# Patient Record
Sex: FEMALE | Race: OTHER | HISPANIC OR LATINO
[De-identification: names, ages, dates, MRNs, and addresses within clinical notes are randomized per-mention and may not be internally consistent; named-entity substitution may affect disease eponyms.]

---

## 2019-01-01 ENCOUNTER — RESULT REVIEW (OUTPATIENT)
Age: 84
End: 2019-01-01

## 2019-01-01 ENCOUNTER — INPATIENT (INPATIENT)
Facility: HOSPITAL | Age: 84
LOS: 6 days | DRG: 219 | End: 2019-10-24
Attending: SURGERY | Admitting: SURGERY
Payer: MEDICARE

## 2019-01-01 ENCOUNTER — APPOINTMENT (OUTPATIENT)
Dept: VASCULAR SURGERY | Facility: HOSPITAL | Age: 84
End: 2019-01-01

## 2019-01-01 VITALS
RESPIRATION RATE: 18 BRPM | SYSTOLIC BLOOD PRESSURE: 137 MMHG | HEIGHT: 60 IN | OXYGEN SATURATION: 96 % | HEART RATE: 63 BPM | DIASTOLIC BLOOD PRESSURE: 87 MMHG | WEIGHT: 125.66 LBS

## 2019-01-01 VITALS — RESPIRATION RATE: 24 BRPM

## 2019-01-01 DIAGNOSIS — I10 ESSENTIAL (PRIMARY) HYPERTENSION: ICD-10-CM

## 2019-01-01 DIAGNOSIS — Z01.810 ENCOUNTER FOR PREPROCEDURAL CARDIOVASCULAR EXAMINATION: ICD-10-CM

## 2019-01-01 DIAGNOSIS — I71.4 ABDOMINAL AORTIC ANEURYSM, WITHOUT RUPTURE: ICD-10-CM

## 2019-01-01 DIAGNOSIS — R04.0 EPISTAXIS: ICD-10-CM

## 2019-01-01 DIAGNOSIS — E87.6 HYPOKALEMIA: ICD-10-CM

## 2019-01-01 DIAGNOSIS — N39.0 URINARY TRACT INFECTION, SITE NOT SPECIFIED: ICD-10-CM

## 2019-01-01 LAB
-  AMPICILLIN/SULBACTAM: SIGNIFICANT CHANGE UP
-  AMPICILLIN: SIGNIFICANT CHANGE UP
-  CEFAZOLIN: SIGNIFICANT CHANGE UP
-  CEFTRIAXONE: SIGNIFICANT CHANGE UP
-  CIPROFLOXACIN: SIGNIFICANT CHANGE UP
-  GENTAMICIN: SIGNIFICANT CHANGE UP
-  NITROFURANTOIN: SIGNIFICANT CHANGE UP
-  PIPERACILLIN/TAZOBACTAM: SIGNIFICANT CHANGE UP
-  TOBRAMYCIN: SIGNIFICANT CHANGE UP
-  TRIMETHOPRIM/SULFAMETHOXAZOLE: SIGNIFICANT CHANGE UP
ALBUMIN SERPL ELPH-MCNC: 1.9 G/DL — LOW (ref 3.3–5)
ALBUMIN SERPL ELPH-MCNC: 1.9 G/DL — LOW (ref 3.3–5)
ALBUMIN SERPL ELPH-MCNC: 2.2 G/DL — LOW (ref 3.3–5)
ALBUMIN SERPL ELPH-MCNC: 2.3 G/DL — LOW (ref 3.3–5)
ALBUMIN SERPL ELPH-MCNC: 2.5 G/DL — LOW (ref 3.3–5)
ALBUMIN SERPL ELPH-MCNC: 2.7 G/DL — LOW (ref 3.3–5)
ALBUMIN SERPL ELPH-MCNC: 2.7 G/DL — LOW (ref 3.3–5)
ALBUMIN SERPL ELPH-MCNC: 2.8 G/DL — LOW (ref 3.3–5)
ALBUMIN SERPL ELPH-MCNC: 2.8 G/DL — LOW (ref 3.3–5)
ALBUMIN SERPL ELPH-MCNC: 3 G/DL — LOW (ref 3.3–5)
ALBUMIN SERPL ELPH-MCNC: 3.6 G/DL — SIGNIFICANT CHANGE UP (ref 3.3–5)
ALBUMIN SERPL ELPH-MCNC: 3.9 G/DL — SIGNIFICANT CHANGE UP (ref 3.3–5)
ALP SERPL-CCNC: 108 U/L — SIGNIFICANT CHANGE UP (ref 40–120)
ALP SERPL-CCNC: 118 U/L — SIGNIFICANT CHANGE UP (ref 40–120)
ALP SERPL-CCNC: 140 U/L — HIGH (ref 40–120)
ALP SERPL-CCNC: 165 U/L — HIGH (ref 40–120)
ALP SERPL-CCNC: 233 U/L — HIGH (ref 40–120)
ALP SERPL-CCNC: 55 U/L — SIGNIFICANT CHANGE UP (ref 40–120)
ALP SERPL-CCNC: 56 U/L — SIGNIFICANT CHANGE UP (ref 40–120)
ALP SERPL-CCNC: 62 U/L — SIGNIFICANT CHANGE UP (ref 40–120)
ALP SERPL-CCNC: 74 U/L — SIGNIFICANT CHANGE UP (ref 40–120)
ALP SERPL-CCNC: 83 U/L — SIGNIFICANT CHANGE UP (ref 40–120)
ALP SERPL-CCNC: 94 U/L — SIGNIFICANT CHANGE UP (ref 40–120)
ALP SERPL-CCNC: 98 U/L — SIGNIFICANT CHANGE UP (ref 40–120)
ALT FLD-CCNC: 1447 U/L — HIGH (ref 10–45)
ALT FLD-CCNC: 1450 U/L — HIGH (ref 10–45)
ALT FLD-CCNC: 1639 U/L — HIGH (ref 10–45)
ALT FLD-CCNC: 1674 U/L — HIGH (ref 10–45)
ALT FLD-CCNC: 1706 U/L — HIGH (ref 10–45)
ALT FLD-CCNC: 253 U/L — HIGH (ref 10–45)
ALT FLD-CCNC: 49 U/L — HIGH (ref 10–45)
ALT FLD-CCNC: 49 U/L — HIGH (ref 10–45)
ALT FLD-CCNC: 54 U/L — HIGH (ref 10–45)
ALT FLD-CCNC: 6 U/L — LOW (ref 10–45)
ALT FLD-CCNC: 6 U/L — LOW (ref 10–45)
ALT FLD-CCNC: 78 U/L — HIGH (ref 10–45)
AMMONIA BLD-MCNC: 151 UMOL/L — HIGH (ref 11–55)
AMMONIA BLD-MCNC: 160 UMOL/L — HIGH (ref 11–55)
AMMONIA BLD-MCNC: 165 UMOL/L — HIGH (ref 11–55)
AMMONIA BLD-MCNC: 261 UMOL/L — HIGH (ref 11–55)
AMYLASE P1 CFR SERPL: 176 U/L — HIGH (ref 25–125)
AMYLASE P1 CFR SERPL: 191 U/L — HIGH (ref 25–125)
AMYLASE P1 CFR SERPL: 212 U/L — HIGH (ref 25–125)
AMYLASE P1 CFR SERPL: 229 U/L — HIGH (ref 25–125)
ANION GAP SERPL CALC-SCNC: 10 MMOL/L — SIGNIFICANT CHANGE UP (ref 5–17)
ANION GAP SERPL CALC-SCNC: 10 MMOL/L — SIGNIFICANT CHANGE UP (ref 5–17)
ANION GAP SERPL CALC-SCNC: 11 MMOL/L — SIGNIFICANT CHANGE UP (ref 5–17)
ANION GAP SERPL CALC-SCNC: 12 MMOL/L — SIGNIFICANT CHANGE UP (ref 5–17)
ANION GAP SERPL CALC-SCNC: 12 MMOL/L — SIGNIFICANT CHANGE UP (ref 5–17)
ANION GAP SERPL CALC-SCNC: 13 MMOL/L — SIGNIFICANT CHANGE UP (ref 5–17)
ANION GAP SERPL CALC-SCNC: 14 MMOL/L — SIGNIFICANT CHANGE UP (ref 5–17)
ANION GAP SERPL CALC-SCNC: 17 MMOL/L — SIGNIFICANT CHANGE UP (ref 5–17)
ANION GAP SERPL CALC-SCNC: 18 MMOL/L — HIGH (ref 5–17)
ANION GAP SERPL CALC-SCNC: 22 MMOL/L — HIGH (ref 5–17)
ANION GAP SERPL CALC-SCNC: 24 MMOL/L — HIGH (ref 5–17)
ANION GAP SERPL CALC-SCNC: 27 MMOL/L — HIGH (ref 5–17)
ANION GAP SERPL CALC-SCNC: 28 MMOL/L — HIGH (ref 5–17)
APPEARANCE UR: ABNORMAL
APPEARANCE UR: CLEAR — SIGNIFICANT CHANGE UP
APPEARANCE UR: CLEAR — SIGNIFICANT CHANGE UP
APTT BLD: 104.1 SEC — HIGH (ref 27.5–36.3)
APTT BLD: 31.8 SEC — SIGNIFICANT CHANGE UP (ref 27.5–36.3)
APTT BLD: 33.5 SEC — SIGNIFICANT CHANGE UP (ref 27.5–36.3)
APTT BLD: 37.7 SEC — HIGH (ref 27.5–36.3)
APTT BLD: 39.5 SEC — HIGH (ref 27.5–36.3)
APTT BLD: 39.7 SEC — HIGH (ref 27.5–36.3)
APTT BLD: 39.9 SEC — HIGH (ref 27.5–36.3)
APTT BLD: 40.5 SEC — HIGH (ref 27.5–36.3)
APTT BLD: 43.6 SEC — HIGH (ref 27.5–36.3)
APTT BLD: 44.5 SEC — HIGH (ref 27.5–36.3)
APTT BLD: 49.2 SEC — HIGH (ref 27.5–36.3)
APTT BLD: 50.5 SEC — HIGH (ref 27.5–36.3)
APTT BLD: 52.6 SEC — HIGH (ref 27.5–36.3)
APTT BLD: 53.3 SEC — HIGH (ref 27.5–36.3)
APTT BLD: 66 SEC — HIGH (ref 27.5–36.3)
AST SERPL-CCNC: 1026 U/L — HIGH (ref 10–40)
AST SERPL-CCNC: 107 U/L — HIGH (ref 10–40)
AST SERPL-CCNC: 13 U/L — SIGNIFICANT CHANGE UP (ref 10–40)
AST SERPL-CCNC: 157 U/L — HIGH (ref 10–40)
AST SERPL-CCNC: 16 U/L — SIGNIFICANT CHANGE UP (ref 10–40)
AST SERPL-CCNC: 163 U/L — HIGH (ref 10–40)
AST SERPL-CCNC: 271 U/L — HIGH (ref 10–40)
AST SERPL-CCNC: >7000 U/L — HIGH (ref 10–40)
AST SERPL-CCNC: >7000 U/L — SIGNIFICANT CHANGE UP (ref 10–40)
AST SERPL-CCNC: SIGNIFICANT CHANGE UP U/L (ref 10–40)
BACTERIA # UR AUTO: PRESENT /HPF
BASE EXCESS BLDA CALC-SCNC: -11.1 MMOL/L — LOW (ref -2–3)
BASE EXCESS BLDA CALC-SCNC: -12 MMOL/L — LOW (ref -2–3)
BASE EXCESS BLDA CALC-SCNC: -14 MMOL/L — LOW (ref -2–3)
BASE EXCESS BLDA CALC-SCNC: -18.1 MMOL/L — LOW (ref -2–3)
BASE EXCESS BLDA CALC-SCNC: -3.1 MMOL/L — LOW (ref -2–3)
BASE EXCESS BLDA CALC-SCNC: -3.6 MMOL/L — LOW (ref -2–3)
BASE EXCESS BLDA CALC-SCNC: -4 MMOL/L — LOW (ref -2–3)
BASE EXCESS BLDA CALC-SCNC: -4.2 MMOL/L — LOW (ref -2–3)
BASE EXCESS BLDA CALC-SCNC: -4.6 MMOL/L — LOW (ref -2–3)
BASE EXCESS BLDA CALC-SCNC: -5.3 MMOL/L — LOW (ref -2–3)
BASE EXCESS BLDA CALC-SCNC: -5.4 MMOL/L — LOW (ref -2–3)
BASE EXCESS BLDA CALC-SCNC: -5.6 MMOL/L — LOW (ref -2–3)
BASE EXCESS BLDA CALC-SCNC: -5.9 MMOL/L — LOW (ref -2–3)
BASE EXCESS BLDA CALC-SCNC: -6.8 MMOL/L — LOW (ref -2–3)
BASE EXCESS BLDA CALC-SCNC: -7.2 MMOL/L — LOW (ref -2–3)
BASE EXCESS BLDA CALC-SCNC: -7.3 MMOL/L — LOW (ref -2–3)
BASE EXCESS BLDA CALC-SCNC: -8.2 MMOL/L — LOW (ref -2–3)
BASE EXCESS BLDA CALC-SCNC: -8.2 MMOL/L — LOW (ref -2–3)
BASE EXCESS BLDA CALC-SCNC: -8.8 MMOL/L — LOW (ref -2–3)
BASE EXCESS BLDA CALC-SCNC: -8.8 MMOL/L — LOW (ref -2–3)
BASOPHILS # BLD AUTO: 0.03 K/UL — SIGNIFICANT CHANGE UP (ref 0–0.2)
BASOPHILS NFR BLD AUTO: 0.7 % — SIGNIFICANT CHANGE UP (ref 0–2)
BILIRUB DIRECT SERPL-MCNC: <0.2 MG/DL — SIGNIFICANT CHANGE UP (ref 0–0.2)
BILIRUB DIRECT SERPL-MCNC: <0.2 MG/DL — SIGNIFICANT CHANGE UP (ref 0–0.2)
BILIRUB INDIRECT FLD-MCNC: >0.3 MG/DL — SIGNIFICANT CHANGE UP (ref 0.2–1)
BILIRUB INDIRECT FLD-MCNC: >0.3 MG/DL — SIGNIFICANT CHANGE UP (ref 0.2–1.2)
BILIRUB SERPL-MCNC: 0.2 MG/DL — SIGNIFICANT CHANGE UP (ref 0.2–1.2)
BILIRUB SERPL-MCNC: 0.3 MG/DL — SIGNIFICANT CHANGE UP (ref 0.2–1.2)
BILIRUB SERPL-MCNC: 0.4 MG/DL — SIGNIFICANT CHANGE UP (ref 0.2–1.2)
BILIRUB SERPL-MCNC: 0.5 MG/DL — SIGNIFICANT CHANGE UP (ref 0.2–1.2)
BILIRUB SERPL-MCNC: 1.2 MG/DL — SIGNIFICANT CHANGE UP (ref 0.2–1.2)
BILIRUB SERPL-MCNC: 1.6 MG/DL — HIGH (ref 0.2–1.2)
BILIRUB SERPL-MCNC: 1.8 MG/DL — HIGH (ref 0.2–1.2)
BILIRUB SERPL-MCNC: 1.9 MG/DL — HIGH (ref 0.2–1.2)
BILIRUB SERPL-MCNC: 1.9 MG/DL — HIGH (ref 0.2–1.2)
BILIRUB UR-MCNC: NEGATIVE — SIGNIFICANT CHANGE UP
BLD GP AB SCN SERPL QL: NEGATIVE — SIGNIFICANT CHANGE UP
BUN SERPL-MCNC: 10 MG/DL — SIGNIFICANT CHANGE UP (ref 7–23)
BUN SERPL-MCNC: 11 MG/DL — SIGNIFICANT CHANGE UP (ref 7–23)
BUN SERPL-MCNC: 14 MG/DL — SIGNIFICANT CHANGE UP (ref 7–23)
BUN SERPL-MCNC: 14 MG/DL — SIGNIFICANT CHANGE UP (ref 7–23)
BUN SERPL-MCNC: 16 MG/DL — SIGNIFICANT CHANGE UP (ref 7–23)
BUN SERPL-MCNC: 16 MG/DL — SIGNIFICANT CHANGE UP (ref 7–23)
BUN SERPL-MCNC: 18 MG/DL — SIGNIFICANT CHANGE UP (ref 7–23)
BUN SERPL-MCNC: 19 MG/DL — SIGNIFICANT CHANGE UP (ref 7–23)
BUN SERPL-MCNC: 21 MG/DL — SIGNIFICANT CHANGE UP (ref 7–23)
BUN SERPL-MCNC: 23 MG/DL — SIGNIFICANT CHANGE UP (ref 7–23)
BUN SERPL-MCNC: 23 MG/DL — SIGNIFICANT CHANGE UP (ref 7–23)
BUN SERPL-MCNC: 24 MG/DL — HIGH (ref 7–23)
BUN SERPL-MCNC: 25 MG/DL — HIGH (ref 7–23)
BUN SERPL-MCNC: 25 MG/DL — HIGH (ref 7–23)
BUN SERPL-MCNC: 28 MG/DL — HIGH (ref 7–23)
CA-I BLDA-SCNC: 0.86 MMOL/L — LOW (ref 1.12–1.3)
CA-I BLDA-SCNC: 0.87 MMOL/L — LOW (ref 1.12–1.3)
CA-I BLDA-SCNC: 0.9 MMOL/L — LOW (ref 1.12–1.3)
CA-I BLDA-SCNC: 0.94 MMOL/L — LOW (ref 1.12–1.3)
CA-I BLDA-SCNC: 0.97 MMOL/L — LOW (ref 1.12–1.3)
CA-I BLDA-SCNC: 1.06 MMOL/L — LOW (ref 1.12–1.3)
CA-I BLDA-SCNC: 1.07 MMOL/L — LOW (ref 1.12–1.3)
CA-I BLDA-SCNC: 1.08 MMOL/L — LOW (ref 1.12–1.3)
CA-I BLDA-SCNC: 1.1 MMOL/L — LOW (ref 1.12–1.3)
CALCIUM SERPL-MCNC: 10.6 MG/DL — HIGH (ref 8.4–10.5)
CALCIUM SERPL-MCNC: 7.6 MG/DL — LOW (ref 8.4–10.5)
CALCIUM SERPL-MCNC: 7.7 MG/DL — LOW (ref 8.4–10.5)
CALCIUM SERPL-MCNC: 7.8 MG/DL — LOW (ref 8.4–10.5)
CALCIUM SERPL-MCNC: 7.9 MG/DL — LOW (ref 8.4–10.5)
CALCIUM SERPL-MCNC: 8 MG/DL — LOW (ref 8.4–10.5)
CALCIUM SERPL-MCNC: 8 MG/DL — LOW (ref 8.4–10.5)
CALCIUM SERPL-MCNC: 8.2 MG/DL — LOW (ref 8.4–10.5)
CALCIUM SERPL-MCNC: 8.2 MG/DL — LOW (ref 8.4–10.5)
CALCIUM SERPL-MCNC: 8.4 MG/DL — SIGNIFICANT CHANGE UP (ref 8.4–10.5)
CALCIUM SERPL-MCNC: 8.5 MG/DL — SIGNIFICANT CHANGE UP (ref 8.4–10.5)
CALCIUM SERPL-MCNC: 8.8 MG/DL — SIGNIFICANT CHANGE UP (ref 8.4–10.5)
CALCIUM SERPL-MCNC: 8.9 MG/DL — SIGNIFICANT CHANGE UP (ref 8.4–10.5)
CALCIUM SERPL-MCNC: 9 MG/DL — SIGNIFICANT CHANGE UP (ref 8.4–10.5)
CALCIUM SERPL-MCNC: 9.1 MG/DL — SIGNIFICANT CHANGE UP (ref 8.4–10.5)
CALCIUM SERPL-MCNC: 9.1 MG/DL — SIGNIFICANT CHANGE UP (ref 8.4–10.5)
CALCIUM SERPL-MCNC: 9.3 MG/DL — SIGNIFICANT CHANGE UP (ref 8.4–10.5)
CALCIUM SERPL-MCNC: 9.5 MG/DL — SIGNIFICANT CHANGE UP (ref 8.4–10.5)
CHLORIDE SERPL-SCNC: 103 MMOL/L — SIGNIFICANT CHANGE UP (ref 96–108)
CHLORIDE SERPL-SCNC: 104 MMOL/L — SIGNIFICANT CHANGE UP (ref 96–108)
CHLORIDE SERPL-SCNC: 105 MMOL/L — SIGNIFICANT CHANGE UP (ref 96–108)
CHLORIDE SERPL-SCNC: 106 MMOL/L — SIGNIFICANT CHANGE UP (ref 96–108)
CHLORIDE SERPL-SCNC: 107 MMOL/L — SIGNIFICANT CHANGE UP (ref 96–108)
CHLORIDE SERPL-SCNC: 108 MMOL/L — SIGNIFICANT CHANGE UP (ref 96–108)
CHLORIDE SERPL-SCNC: 108 MMOL/L — SIGNIFICANT CHANGE UP (ref 96–108)
CHLORIDE SERPL-SCNC: 109 MMOL/L — HIGH (ref 96–108)
CHLORIDE SERPL-SCNC: 109 MMOL/L — HIGH (ref 96–108)
CHLORIDE SERPL-SCNC: 110 MMOL/L — HIGH (ref 96–108)
CHLORIDE SERPL-SCNC: 110 MMOL/L — HIGH (ref 96–108)
CHLORIDE SERPL-SCNC: 111 MMOL/L — HIGH (ref 96–108)
CHLORIDE SERPL-SCNC: 111 MMOL/L — HIGH (ref 96–108)
CHLORIDE UR-SCNC: 131 MMOL/L — SIGNIFICANT CHANGE UP
CK SERPL-CCNC: 1639 U/L — HIGH (ref 25–170)
CO2 SERPL-SCNC: 11 MMOL/L — LOW (ref 22–31)
CO2 SERPL-SCNC: 12 MMOL/L — LOW (ref 22–31)
CO2 SERPL-SCNC: 13 MMOL/L — LOW (ref 22–31)
CO2 SERPL-SCNC: 14 MMOL/L — LOW (ref 22–31)
CO2 SERPL-SCNC: 15 MMOL/L — LOW (ref 22–31)
CO2 SERPL-SCNC: 15 MMOL/L — LOW (ref 22–31)
CO2 SERPL-SCNC: 17 MMOL/L — LOW (ref 22–31)
CO2 SERPL-SCNC: 18 MMOL/L — LOW (ref 22–31)
CO2 SERPL-SCNC: 19 MMOL/L — LOW (ref 22–31)
CO2 SERPL-SCNC: 19 MMOL/L — LOW (ref 22–31)
CO2 SERPL-SCNC: 20 MMOL/L — LOW (ref 22–31)
CO2 SERPL-SCNC: 22 MMOL/L — SIGNIFICANT CHANGE UP (ref 22–31)
CO2 SERPL-SCNC: 23 MMOL/L — SIGNIFICANT CHANGE UP (ref 22–31)
CO2 SERPL-SCNC: 9 MMOL/L — CRITICAL LOW (ref 22–31)
COHGB MFR BLDA: 0.1 % — SIGNIFICANT CHANGE UP
COHGB MFR BLDA: 0.1 % — SIGNIFICANT CHANGE UP
COHGB MFR BLDA: 0.3 % — SIGNIFICANT CHANGE UP
COHGB MFR BLDA: 0.4 % — SIGNIFICANT CHANGE UP
COHGB MFR BLDA: 0.5 % — SIGNIFICANT CHANGE UP
COHGB MFR BLDA: 0.8 % — SIGNIFICANT CHANGE UP
COHGB MFR BLDA: 0.8 % — SIGNIFICANT CHANGE UP
COLOR SPEC: YELLOW — SIGNIFICANT CHANGE UP
COMMENT - URINE: SIGNIFICANT CHANGE UP
CORTIS AM PEAK SERPL-MCNC: 11.9 UG/DL — SIGNIFICANT CHANGE UP (ref 3.9–37.5)
CREAT ?TM UR-MCNC: 6 MG/DL — SIGNIFICANT CHANGE UP
CREAT SERPL-MCNC: 0.87 MG/DL — SIGNIFICANT CHANGE UP (ref 0.5–1.3)
CREAT SERPL-MCNC: 0.96 MG/DL — SIGNIFICANT CHANGE UP (ref 0.5–1.3)
CREAT SERPL-MCNC: 1.03 MG/DL — SIGNIFICANT CHANGE UP (ref 0.5–1.3)
CREAT SERPL-MCNC: 1.03 MG/DL — SIGNIFICANT CHANGE UP (ref 0.5–1.3)
CREAT SERPL-MCNC: 1.19 MG/DL — SIGNIFICANT CHANGE UP (ref 0.5–1.3)
CREAT SERPL-MCNC: 1.44 MG/DL — HIGH (ref 0.5–1.3)
CREAT SERPL-MCNC: 1.63 MG/DL — HIGH (ref 0.5–1.3)
CREAT SERPL-MCNC: 1.7 MG/DL — HIGH (ref 0.5–1.3)
CREAT SERPL-MCNC: 1.73 MG/DL — HIGH (ref 0.5–1.3)
CREAT SERPL-MCNC: 1.8 MG/DL — HIGH (ref 0.5–1.3)
CREAT SERPL-MCNC: 1.86 MG/DL — HIGH (ref 0.5–1.3)
CREAT SERPL-MCNC: 2.03 MG/DL — HIGH (ref 0.5–1.3)
CREAT SERPL-MCNC: 2.29 MG/DL — HIGH (ref 0.5–1.3)
CREAT SERPL-MCNC: 2.3 MG/DL — HIGH (ref 0.5–1.3)
CREAT SERPL-MCNC: 2.75 MG/DL — HIGH (ref 0.5–1.3)
CREAT SERPL-MCNC: 2.88 MG/DL — HIGH (ref 0.5–1.3)
CREAT SERPL-MCNC: 2.89 MG/DL — HIGH (ref 0.5–1.3)
CREAT SERPL-MCNC: 3.51 MG/DL — HIGH (ref 0.5–1.3)
CULTURE RESULTS: SIGNIFICANT CHANGE UP
D DIMER BLD IA.RAPID-MCNC: 6022 NG/ML DDU — HIGH
D DIMER BLD IA.RAPID-MCNC: HIGH NG/ML DDU
DIFF PNL FLD: ABNORMAL
DIFF PNL FLD: ABNORMAL
DIFF PNL FLD: NEGATIVE — SIGNIFICANT CHANGE UP
EOSINOPHIL # BLD AUTO: 0.32 K/UL — SIGNIFICANT CHANGE UP (ref 0–0.5)
EOSINOPHIL NFR BLD AUTO: 7.5 % — HIGH (ref 0–6)
EPI CELLS # UR: SIGNIFICANT CHANGE UP /HPF (ref 0–5)
FIBRINOGEN PPP-MCNC: 113 MG/DL — LOW (ref 258–438)
FIBRINOGEN PPP-MCNC: 138 MG/DL — LOW (ref 258–438)
FIBRINOGEN PPP-MCNC: 261 MG/DL — SIGNIFICANT CHANGE UP (ref 258–438)
FIBRINOGEN PPP-MCNC: 412 MG/DL — SIGNIFICANT CHANGE UP (ref 258–438)
FIBRINOGEN PPP-MCNC: 418 MG/DL — SIGNIFICANT CHANGE UP (ref 258–438)
GAS PNL BLDA: SIGNIFICANT CHANGE UP
GAS PNL BLDV: SIGNIFICANT CHANGE UP
GLUCOSE BLDC GLUCOMTR-MCNC: 103 MG/DL — HIGH (ref 70–99)
GLUCOSE BLDC GLUCOMTR-MCNC: 109 MG/DL — HIGH (ref 70–99)
GLUCOSE BLDC GLUCOMTR-MCNC: 124 MG/DL — HIGH (ref 70–99)
GLUCOSE BLDC GLUCOMTR-MCNC: 127 MG/DL — HIGH (ref 70–99)
GLUCOSE BLDC GLUCOMTR-MCNC: 131 MG/DL — HIGH (ref 70–99)
GLUCOSE BLDC GLUCOMTR-MCNC: 135 MG/DL — HIGH (ref 70–99)
GLUCOSE BLDC GLUCOMTR-MCNC: 135 MG/DL — HIGH (ref 70–99)
GLUCOSE BLDC GLUCOMTR-MCNC: 143 MG/DL — HIGH (ref 70–99)
GLUCOSE BLDC GLUCOMTR-MCNC: 144 MG/DL — HIGH (ref 70–99)
GLUCOSE BLDC GLUCOMTR-MCNC: 153 MG/DL — HIGH (ref 70–99)
GLUCOSE BLDC GLUCOMTR-MCNC: 154 MG/DL — HIGH (ref 70–99)
GLUCOSE BLDC GLUCOMTR-MCNC: 159 MG/DL — HIGH (ref 70–99)
GLUCOSE BLDC GLUCOMTR-MCNC: 196 MG/DL — HIGH (ref 70–99)
GLUCOSE BLDC GLUCOMTR-MCNC: 20 MG/DL — CRITICAL LOW (ref 70–99)
GLUCOSE BLDC GLUCOMTR-MCNC: 220 MG/DL — HIGH (ref 70–99)
GLUCOSE BLDC GLUCOMTR-MCNC: 226 MG/DL — HIGH (ref 70–99)
GLUCOSE BLDC GLUCOMTR-MCNC: 226 MG/DL — HIGH (ref 70–99)
GLUCOSE BLDC GLUCOMTR-MCNC: 243 MG/DL — HIGH (ref 70–99)
GLUCOSE BLDC GLUCOMTR-MCNC: 248 MG/DL — HIGH (ref 70–99)
GLUCOSE BLDC GLUCOMTR-MCNC: 30 MG/DL — CRITICAL LOW (ref 70–99)
GLUCOSE BLDC GLUCOMTR-MCNC: 34 MG/DL — CRITICAL LOW (ref 70–99)
GLUCOSE BLDC GLUCOMTR-MCNC: 35 MG/DL — CRITICAL LOW (ref 70–99)
GLUCOSE BLDC GLUCOMTR-MCNC: 75 MG/DL — SIGNIFICANT CHANGE UP (ref 70–99)
GLUCOSE BLDC GLUCOMTR-MCNC: 77 MG/DL — SIGNIFICANT CHANGE UP (ref 70–99)
GLUCOSE BLDC GLUCOMTR-MCNC: 84 MG/DL — SIGNIFICANT CHANGE UP (ref 70–99)
GLUCOSE BLDC GLUCOMTR-MCNC: 90 MG/DL — SIGNIFICANT CHANGE UP (ref 70–99)
GLUCOSE BLDC GLUCOMTR-MCNC: 94 MG/DL — SIGNIFICANT CHANGE UP (ref 70–99)
GLUCOSE SERPL-MCNC: 101 MG/DL — HIGH (ref 70–99)
GLUCOSE SERPL-MCNC: 102 MG/DL — HIGH (ref 70–99)
GLUCOSE SERPL-MCNC: 109 MG/DL — HIGH (ref 70–99)
GLUCOSE SERPL-MCNC: 116 MG/DL — HIGH (ref 70–99)
GLUCOSE SERPL-MCNC: 122 MG/DL — HIGH (ref 70–99)
GLUCOSE SERPL-MCNC: 129 MG/DL — HIGH (ref 70–99)
GLUCOSE SERPL-MCNC: 137 MG/DL — HIGH (ref 70–99)
GLUCOSE SERPL-MCNC: 140 MG/DL — HIGH (ref 70–99)
GLUCOSE SERPL-MCNC: 142 MG/DL — HIGH (ref 70–99)
GLUCOSE SERPL-MCNC: 147 MG/DL — HIGH (ref 70–99)
GLUCOSE SERPL-MCNC: 186 MG/DL — HIGH (ref 70–99)
GLUCOSE SERPL-MCNC: 202 MG/DL — HIGH (ref 70–99)
GLUCOSE SERPL-MCNC: 43 MG/DL — CRITICAL LOW (ref 70–99)
GLUCOSE SERPL-MCNC: 78 MG/DL — SIGNIFICANT CHANGE UP (ref 70–99)
GLUCOSE SERPL-MCNC: 89 MG/DL — SIGNIFICANT CHANGE UP (ref 70–99)
GLUCOSE SERPL-MCNC: 90 MG/DL — SIGNIFICANT CHANGE UP (ref 70–99)
GLUCOSE SERPL-MCNC: 97 MG/DL — SIGNIFICANT CHANGE UP (ref 70–99)
GLUCOSE SERPL-MCNC: 97 MG/DL — SIGNIFICANT CHANGE UP (ref 70–99)
GLUCOSE UR QL: NEGATIVE — SIGNIFICANT CHANGE UP
HBA1C BLD-MCNC: 5.4 % — SIGNIFICANT CHANGE UP (ref 4–5.6)
HBV CORE AB SER-ACNC: SIGNIFICANT CHANGE UP
HBV SURFACE AB SER-ACNC: SIGNIFICANT CHANGE UP
HBV SURFACE AG SER-ACNC: SIGNIFICANT CHANGE UP
HCO3 BLDA-SCNC: 10 MMOL/L — LOW (ref 21–28)
HCO3 BLDA-SCNC: 14 MMOL/L — LOW (ref 21–28)
HCO3 BLDA-SCNC: 15 MMOL/L — LOW (ref 21–28)
HCO3 BLDA-SCNC: 16 MMOL/L — LOW (ref 21–28)
HCO3 BLDA-SCNC: 17 MMOL/L — LOW (ref 21–28)
HCO3 BLDA-SCNC: 18 MMOL/L — LOW (ref 21–28)
HCO3 BLDA-SCNC: 19 MMOL/L — LOW (ref 21–28)
HCO3 BLDA-SCNC: 20 MMOL/L — LOW (ref 21–28)
HCO3 BLDA-SCNC: 21 MMOL/L — SIGNIFICANT CHANGE UP (ref 21–28)
HCO3 BLDA-SCNC: 21 MMOL/L — SIGNIFICANT CHANGE UP (ref 21–28)
HCO3 BLDA-SCNC: 22 MMOL/L — SIGNIFICANT CHANGE UP (ref 21–28)
HCT VFR BLD CALC: 26.9 % — LOW (ref 34.5–45)
HCT VFR BLD CALC: 31.3 % — LOW (ref 34.5–45)
HCT VFR BLD CALC: 31.9 % — LOW (ref 34.5–45)
HCT VFR BLD CALC: 33.5 % — LOW (ref 34.5–45)
HCT VFR BLD CALC: 33.5 % — LOW (ref 34.5–45)
HCT VFR BLD CALC: 36.5 % — SIGNIFICANT CHANGE UP (ref 34.5–45)
HCT VFR BLD CALC: 37.4 % — SIGNIFICANT CHANGE UP (ref 34.5–45)
HCT VFR BLD CALC: 37.9 % — SIGNIFICANT CHANGE UP (ref 34.5–45)
HCT VFR BLD CALC: 38.3 % — SIGNIFICANT CHANGE UP (ref 34.5–45)
HCT VFR BLD CALC: 39.1 % — SIGNIFICANT CHANGE UP (ref 34.5–45)
HCT VFR BLD CALC: 39.3 % — SIGNIFICANT CHANGE UP (ref 34.5–45)
HCT VFR BLD CALC: 40.1 % — SIGNIFICANT CHANGE UP (ref 34.5–45)
HCT VFR BLD CALC: 41.1 % — SIGNIFICANT CHANGE UP (ref 34.5–45)
HCT VFR BLD CALC: 42.2 % — SIGNIFICANT CHANGE UP (ref 34.5–45)
HCT VFR BLD CALC: 42.6 % — SIGNIFICANT CHANGE UP (ref 34.5–45)
HCT VFR BLD CALC: 43.6 % — SIGNIFICANT CHANGE UP (ref 34.5–45)
HCT VFR BLD CALC: 45.2 % — HIGH (ref 34.5–45)
HCT VFR BLD CALC: 45.9 % — HIGH (ref 34.5–45)
HCV AB S/CO SERPL IA: 0.1 S/CO — SIGNIFICANT CHANGE UP
HCV AB SERPL-IMP: SIGNIFICANT CHANGE UP
HEPARIN-PF4 AB RESULT: <0.6 U/ML — SIGNIFICANT CHANGE UP (ref 0–0.9)
HGB BLD-MCNC: 10.4 G/DL — LOW (ref 11.5–15.5)
HGB BLD-MCNC: 10.9 G/DL — LOW (ref 11.5–15.5)
HGB BLD-MCNC: 11.8 G/DL — SIGNIFICANT CHANGE UP (ref 11.5–15.5)
HGB BLD-MCNC: 11.9 G/DL — SIGNIFICANT CHANGE UP (ref 11.5–15.5)
HGB BLD-MCNC: 12.1 G/DL — SIGNIFICANT CHANGE UP (ref 11.5–15.5)
HGB BLD-MCNC: 12.3 G/DL — SIGNIFICANT CHANGE UP (ref 11.5–15.5)
HGB BLD-MCNC: 12.7 G/DL — SIGNIFICANT CHANGE UP (ref 11.5–15.5)
HGB BLD-MCNC: 12.8 G/DL — SIGNIFICANT CHANGE UP (ref 11.5–15.5)
HGB BLD-MCNC: 12.9 G/DL — SIGNIFICANT CHANGE UP (ref 11.5–15.5)
HGB BLD-MCNC: 13.6 G/DL — SIGNIFICANT CHANGE UP (ref 11.5–15.5)
HGB BLD-MCNC: 14 G/DL — SIGNIFICANT CHANGE UP (ref 11.5–15.5)
HGB BLD-MCNC: 14.1 G/DL — SIGNIFICANT CHANGE UP (ref 11.5–15.5)
HGB BLD-MCNC: 14.3 G/DL — SIGNIFICANT CHANGE UP (ref 11.5–15.5)
HGB BLD-MCNC: 14.6 G/DL — SIGNIFICANT CHANGE UP (ref 11.5–15.5)
HGB BLD-MCNC: 14.9 G/DL — SIGNIFICANT CHANGE UP (ref 11.5–15.5)
HGB BLD-MCNC: 8.3 G/DL — LOW (ref 11.5–15.5)
HGB BLD-MCNC: 9.7 G/DL — LOW (ref 11.5–15.5)
HGB BLD-MCNC: 9.8 G/DL — LOW (ref 11.5–15.5)
HGB BLDA-MCNC: 10.4 G/DL — LOW (ref 11.5–15.5)
HGB BLDA-MCNC: 11.1 G/DL — LOW (ref 11.5–15.5)
HGB BLDA-MCNC: 11.5 G/DL — SIGNIFICANT CHANGE UP (ref 11.5–15.5)
HGB BLDA-MCNC: 12.4 G/DL — SIGNIFICANT CHANGE UP (ref 11.5–15.5)
HGB BLDA-MCNC: 14.4 G/DL — SIGNIFICANT CHANGE UP (ref 11.5–15.5)
HGB BLDA-MCNC: 6.5 G/DL — CRITICAL LOW (ref 11.5–15.5)
HGB BLDA-MCNC: 8 G/DL — LOW (ref 11.5–15.5)
HGB BLDA-MCNC: 8.2 G/DL — LOW (ref 11.5–15.5)
HGB BLDA-MCNC: 8.6 G/DL — LOW (ref 11.5–15.5)
HIV 1+2 AB+HIV1 P24 AG SERPL QL IA: SIGNIFICANT CHANGE UP
IMM GRANULOCYTES NFR BLD AUTO: 0.2 % — SIGNIFICANT CHANGE UP (ref 0–1.5)
INR BLD: 1.2 — HIGH (ref 0.88–1.16)
INR BLD: 1.25 — HIGH (ref 0.88–1.16)
INR BLD: 1.25 — HIGH (ref 0.88–1.16)
INR BLD: 1.3 — HIGH (ref 0.88–1.16)
INR BLD: 1.32 — HIGH (ref 0.88–1.16)
INR BLD: 1.32 — HIGH (ref 0.88–1.16)
INR BLD: 1.51 — HIGH (ref 0.88–1.16)
INR BLD: 1.63 — HIGH (ref 0.88–1.16)
INR BLD: 1.95 — HIGH (ref 0.88–1.16)
INR BLD: 2.22 — HIGH (ref 0.88–1.16)
INR BLD: 2.35 — HIGH (ref 0.88–1.16)
INR BLD: 2.44 — HIGH (ref 0.88–1.16)
INR BLD: 2.57 — HIGH (ref 0.88–1.16)
INR BLD: 2.99 — HIGH (ref 0.88–1.16)
INR BLD: 3.79 — HIGH (ref 0.88–1.16)
KETONES UR-MCNC: NEGATIVE — SIGNIFICANT CHANGE UP
LACTATE SERPL-SCNC: 11.2 MMOL/L — CRITICAL HIGH (ref 0.5–2)
LACTATE SERPL-SCNC: 11.3 MMOL/L — CRITICAL HIGH (ref 0.5–2)
LACTATE SERPL-SCNC: 12.8 MMOL/L — CRITICAL HIGH (ref 0.5–2)
LACTATE SERPL-SCNC: 16 MMOL/L — CRITICAL HIGH (ref 0.5–2)
LACTATE SERPL-SCNC: 17 MMOL/L — CRITICAL HIGH (ref 0.5–2)
LACTATE SERPL-SCNC: 19 MMOL/L — CRITICAL HIGH (ref 0.5–2)
LACTATE SERPL-SCNC: 3.3 MMOL/L — HIGH (ref 0.5–2)
LACTATE SERPL-SCNC: 3.7 MMOL/L — HIGH (ref 0.5–2)
LACTATE SERPL-SCNC: 3.8 MMOL/L — HIGH (ref 0.5–2)
LACTATE SERPL-SCNC: 3.9 MMOL/L — HIGH (ref 0.5–2)
LACTATE SERPL-SCNC: 4 MMOL/L — CRITICAL HIGH (ref 0.5–2)
LACTATE SERPL-SCNC: 5.1 MMOL/L — CRITICAL HIGH (ref 0.5–2)
LACTATE SERPL-SCNC: 8.2 MMOL/L — CRITICAL HIGH (ref 0.5–2)
LACTATE SERPL-SCNC: 9.3 MMOL/L — CRITICAL HIGH (ref 0.5–2)
LEUKOCYTE ESTERASE UR-ACNC: ABNORMAL
LEUKOCYTE ESTERASE UR-ACNC: NEGATIVE — SIGNIFICANT CHANGE UP
LEUKOCYTE ESTERASE UR-ACNC: NEGATIVE — SIGNIFICANT CHANGE UP
LIDOCAIN IGE QN: 38 U/L — SIGNIFICANT CHANGE UP (ref 7–60)
LIDOCAIN IGE QN: 43 U/L — SIGNIFICANT CHANGE UP (ref 7–60)
LIDOCAIN IGE QN: 48 U/L — SIGNIFICANT CHANGE UP (ref 7–60)
LIDOCAIN IGE QN: 51 U/L — SIGNIFICANT CHANGE UP (ref 7–60)
LYMPHOCYTES # BLD AUTO: 1.1 K/UL — SIGNIFICANT CHANGE UP (ref 1–3.3)
LYMPHOCYTES # BLD AUTO: 25.7 % — SIGNIFICANT CHANGE UP (ref 13–44)
MAGNESIUM SERPL-MCNC: 1.3 MG/DL — LOW (ref 1.6–2.6)
MAGNESIUM SERPL-MCNC: 1.9 MG/DL — SIGNIFICANT CHANGE UP (ref 1.6–2.6)
MAGNESIUM SERPL-MCNC: 2 MG/DL — SIGNIFICANT CHANGE UP (ref 1.6–2.6)
MAGNESIUM SERPL-MCNC: 2.1 MG/DL — SIGNIFICANT CHANGE UP (ref 1.6–2.6)
MAGNESIUM SERPL-MCNC: 2.2 MG/DL — SIGNIFICANT CHANGE UP (ref 1.6–2.6)
MAGNESIUM SERPL-MCNC: 2.2 MG/DL — SIGNIFICANT CHANGE UP (ref 1.6–2.6)
MAGNESIUM SERPL-MCNC: 2.3 MG/DL — SIGNIFICANT CHANGE UP (ref 1.6–2.6)
MCHC RBC-ENTMCNC: 28.6 PG — SIGNIFICANT CHANGE UP (ref 27–34)
MCHC RBC-ENTMCNC: 28.7 PG — SIGNIFICANT CHANGE UP (ref 27–34)
MCHC RBC-ENTMCNC: 28.7 PG — SIGNIFICANT CHANGE UP (ref 27–34)
MCHC RBC-ENTMCNC: 28.8 PG — SIGNIFICANT CHANGE UP (ref 27–34)
MCHC RBC-ENTMCNC: 28.9 PG — SIGNIFICANT CHANGE UP (ref 27–34)
MCHC RBC-ENTMCNC: 28.9 PG — SIGNIFICANT CHANGE UP (ref 27–34)
MCHC RBC-ENTMCNC: 29 PG — SIGNIFICANT CHANGE UP (ref 27–34)
MCHC RBC-ENTMCNC: 29 PG — SIGNIFICANT CHANGE UP (ref 27–34)
MCHC RBC-ENTMCNC: 29.1 PG — SIGNIFICANT CHANGE UP (ref 27–34)
MCHC RBC-ENTMCNC: 29.2 PG — SIGNIFICANT CHANGE UP (ref 27–34)
MCHC RBC-ENTMCNC: 29.2 PG — SIGNIFICANT CHANGE UP (ref 27–34)
MCHC RBC-ENTMCNC: 29.3 PG — SIGNIFICANT CHANGE UP (ref 27–34)
MCHC RBC-ENTMCNC: 29.4 PG — SIGNIFICANT CHANGE UP (ref 27–34)
MCHC RBC-ENTMCNC: 29.4 PG — SIGNIFICANT CHANGE UP (ref 27–34)
MCHC RBC-ENTMCNC: 29.5 PG — SIGNIFICANT CHANGE UP (ref 27–34)
MCHC RBC-ENTMCNC: 29.5 PG — SIGNIFICANT CHANGE UP (ref 27–34)
MCHC RBC-ENTMCNC: 29.6 PG — SIGNIFICANT CHANGE UP (ref 27–34)
MCHC RBC-ENTMCNC: 29.6 PG — SIGNIFICANT CHANGE UP (ref 27–34)
MCHC RBC-ENTMCNC: 30.4 GM/DL — LOW (ref 32–36)
MCHC RBC-ENTMCNC: 30.9 GM/DL — LOW (ref 32–36)
MCHC RBC-ENTMCNC: 31 GM/DL — LOW (ref 32–36)
MCHC RBC-ENTMCNC: 31.3 GM/DL — LOW (ref 32–36)
MCHC RBC-ENTMCNC: 31.6 GM/DL — LOW (ref 32–36)
MCHC RBC-ENTMCNC: 31.8 GM/DL — LOW (ref 32–36)
MCHC RBC-ENTMCNC: 32.2 GM/DL — SIGNIFICANT CHANGE UP (ref 32–36)
MCHC RBC-ENTMCNC: 32.3 GM/DL — SIGNIFICANT CHANGE UP (ref 32–36)
MCHC RBC-ENTMCNC: 32.5 GM/DL — SIGNIFICANT CHANGE UP (ref 32–36)
MCHC RBC-ENTMCNC: 32.7 GM/DL — SIGNIFICANT CHANGE UP (ref 32–36)
MCHC RBC-ENTMCNC: 33.1 GM/DL — SIGNIFICANT CHANGE UP (ref 32–36)
MCHC RBC-ENTMCNC: 33.2 GM/DL — SIGNIFICANT CHANGE UP (ref 32–36)
MCHC RBC-ENTMCNC: 33.6 GM/DL — SIGNIFICANT CHANGE UP (ref 32–36)
MCV RBC AUTO: 86.5 FL — SIGNIFICANT CHANGE UP (ref 80–100)
MCV RBC AUTO: 87.1 FL — SIGNIFICANT CHANGE UP (ref 80–100)
MCV RBC AUTO: 88.1 FL — SIGNIFICANT CHANGE UP (ref 80–100)
MCV RBC AUTO: 88.9 FL — SIGNIFICANT CHANGE UP (ref 80–100)
MCV RBC AUTO: 89.2 FL — SIGNIFICANT CHANGE UP (ref 80–100)
MCV RBC AUTO: 89.5 FL — SIGNIFICANT CHANGE UP (ref 80–100)
MCV RBC AUTO: 89.7 FL — SIGNIFICANT CHANGE UP (ref 80–100)
MCV RBC AUTO: 90.1 FL — SIGNIFICANT CHANGE UP (ref 80–100)
MCV RBC AUTO: 90.7 FL — SIGNIFICANT CHANGE UP (ref 80–100)
MCV RBC AUTO: 90.8 FL — SIGNIFICANT CHANGE UP (ref 80–100)
MCV RBC AUTO: 91 FL — SIGNIFICANT CHANGE UP (ref 80–100)
MCV RBC AUTO: 92.7 FL — SIGNIFICANT CHANGE UP (ref 80–100)
MCV RBC AUTO: 93.3 FL — SIGNIFICANT CHANGE UP (ref 80–100)
MCV RBC AUTO: 94.6 FL — SIGNIFICANT CHANGE UP (ref 80–100)
MCV RBC AUTO: 96.1 FL — SIGNIFICANT CHANGE UP (ref 80–100)
MCV RBC AUTO: 96.7 FL — SIGNIFICANT CHANGE UP (ref 80–100)
METHGB MFR BLDA: 0 % — SIGNIFICANT CHANGE UP
METHGB MFR BLDA: 0.3 % — SIGNIFICANT CHANGE UP
METHGB MFR BLDA: 0.4 % — SIGNIFICANT CHANGE UP
METHGB MFR BLDA: 0.4 % — SIGNIFICANT CHANGE UP
METHGB MFR BLDA: 0.5 % — SIGNIFICANT CHANGE UP
METHGB MFR BLDA: 0.9 % — SIGNIFICANT CHANGE UP
METHOD TYPE: SIGNIFICANT CHANGE UP
MONOCYTES # BLD AUTO: 0.42 K/UL — SIGNIFICANT CHANGE UP (ref 0–0.9)
MONOCYTES NFR BLD AUTO: 9.8 % — SIGNIFICANT CHANGE UP (ref 2–14)
NEUTROPHILS # BLD AUTO: 2.4 K/UL — SIGNIFICANT CHANGE UP (ref 1.8–7.4)
NEUTROPHILS NFR BLD AUTO: 56.1 % — SIGNIFICANT CHANGE UP (ref 43–77)
NITRITE UR-MCNC: NEGATIVE — SIGNIFICANT CHANGE UP
NRBC # BLD: 0 /100 WBCS — SIGNIFICANT CHANGE UP (ref 0–0)
NRBC # BLD: 1 /100 WBCS — HIGH (ref 0–0)
NRBC # BLD: 11 /100 WBCS — HIGH (ref 0–0)
NRBC # BLD: 3 /100 WBCS — HIGH (ref 0–0)
NRBC # BLD: 7 /100 WBCS — HIGH (ref 0–0)
NRBC # BLD: 9 /100 WBCS — HIGH (ref 0–0)
NRBC # BLD: SIGNIFICANT CHANGE UP /100 WBCS (ref 0–0)
O2 CT VFR BLDA CALC: 12.2 ML/DL — SIGNIFICANT CHANGE UP (ref 15–23)
O2 CT VFR BLDA CALC: 16.5 ML/DL — SIGNIFICANT CHANGE UP (ref 15–23)
O2 CT VFR BLDA CALC: 20.5 ML/DL — SIGNIFICANT CHANGE UP (ref 15–23)
O2 CT VFR BLDA CALC: 9.3 ML/DL — SIGNIFICANT CHANGE UP (ref 15–23)
O2 CT VFR BLDA CALC: SIGNIFICANT CHANGE UP (ref 15–23)
ORGANISM # SPEC MICROSCOPIC CNT: SIGNIFICANT CHANGE UP
ORGANISM # SPEC MICROSCOPIC CNT: SIGNIFICANT CHANGE UP
OSMOLALITY UR: 313 MOSMOL/KG — SIGNIFICANT CHANGE UP (ref 100–650)
OXYHGB MFR BLDA: 94 % — SIGNIFICANT CHANGE UP (ref 94–100)
OXYHGB MFR BLDA: 98 % — SIGNIFICANT CHANGE UP (ref 94–100)
OXYHGB MFR BLDA: 99 % — SIGNIFICANT CHANGE UP (ref 94–100)
PCO2 BLDA: 25 MMHG — LOW (ref 32–45)
PCO2 BLDA: 30 MMHG — LOW (ref 32–45)
PCO2 BLDA: 31 MMHG — LOW (ref 32–45)
PCO2 BLDA: 32 MMHG — SIGNIFICANT CHANGE UP (ref 32–45)
PCO2 BLDA: 35 MMHG — SIGNIFICANT CHANGE UP (ref 32–45)
PCO2 BLDA: 35 MMHG — SIGNIFICANT CHANGE UP (ref 32–45)
PCO2 BLDA: 36 MMHG — SIGNIFICANT CHANGE UP (ref 32–45)
PCO2 BLDA: 37 MMHG — SIGNIFICANT CHANGE UP (ref 32–45)
PCO2 BLDA: 38 MMHG — SIGNIFICANT CHANGE UP (ref 32–45)
PCO2 BLDA: 38 MMHG — SIGNIFICANT CHANGE UP (ref 32–45)
PCO2 BLDA: 39 MMHG — SIGNIFICANT CHANGE UP (ref 32–45)
PCO2 BLDA: 40 MMHG — SIGNIFICANT CHANGE UP (ref 32–45)
PCO2 BLDA: 40 MMHG — SIGNIFICANT CHANGE UP (ref 32–45)
PCO2 BLDA: 41 MMHG — SIGNIFICANT CHANGE UP (ref 32–45)
PCO2 BLDA: 44 MMHG — SIGNIFICANT CHANGE UP (ref 32–45)
PCO2 BLDA: 45 MMHG — SIGNIFICANT CHANGE UP (ref 32–45)
PF4 HEPARIN CMPLX AB SER-ACNC: NEGATIVE — SIGNIFICANT CHANGE UP
PH BLDA: 7.12 — CRITICAL LOW (ref 7.35–7.45)
PH BLDA: 7.17 — CRITICAL LOW (ref 7.35–7.45)
PH BLDA: 7.2 — CRITICAL LOW (ref 7.35–7.45)
PH BLDA: 7.23 — CRITICAL LOW (ref 7.35–7.45)
PH BLDA: 7.24 — LOW (ref 7.35–7.45)
PH BLDA: 7.29 — LOW (ref 7.35–7.45)
PH BLDA: 7.3 — LOW (ref 7.35–7.45)
PH BLDA: 7.31 — LOW (ref 7.35–7.45)
PH BLDA: 7.31 — LOW (ref 7.35–7.45)
PH BLDA: 7.32 — LOW (ref 7.35–7.45)
PH BLDA: 7.34 — LOW (ref 7.35–7.45)
PH BLDA: 7.34 — LOW (ref 7.35–7.45)
PH BLDA: 7.35 — SIGNIFICANT CHANGE UP (ref 7.35–7.45)
PH BLDA: 7.36 — SIGNIFICANT CHANGE UP (ref 7.35–7.45)
PH BLDA: 7.38 — SIGNIFICANT CHANGE UP (ref 7.35–7.45)
PH BLDA: 7.39 — SIGNIFICANT CHANGE UP (ref 7.35–7.45)
PH BLDA: 7.4 — SIGNIFICANT CHANGE UP (ref 7.35–7.45)
PH BLDA: 7.47 — HIGH (ref 7.35–7.45)
PH UR: 5.5 — SIGNIFICANT CHANGE UP (ref 5–8)
PH UR: 6 — SIGNIFICANT CHANGE UP (ref 5–8)
PH UR: 6 — SIGNIFICANT CHANGE UP (ref 5–8)
PHOSPHATE SERPL-MCNC: 2.2 MG/DL — LOW (ref 2.5–4.5)
PHOSPHATE SERPL-MCNC: 3.3 MG/DL — SIGNIFICANT CHANGE UP (ref 2.5–4.5)
PHOSPHATE SERPL-MCNC: 3.8 MG/DL — SIGNIFICANT CHANGE UP (ref 2.5–4.5)
PHOSPHATE SERPL-MCNC: 3.8 MG/DL — SIGNIFICANT CHANGE UP (ref 2.5–4.5)
PHOSPHATE SERPL-MCNC: 4.2 MG/DL — SIGNIFICANT CHANGE UP (ref 2.5–4.5)
PHOSPHATE SERPL-MCNC: 4.2 MG/DL — SIGNIFICANT CHANGE UP (ref 2.5–4.5)
PHOSPHATE SERPL-MCNC: 4.9 MG/DL — HIGH (ref 2.5–4.5)
PHOSPHATE SERPL-MCNC: 5 MG/DL — HIGH (ref 2.5–4.5)
PHOSPHATE SERPL-MCNC: 5.1 MG/DL — HIGH (ref 2.5–4.5)
PHOSPHATE SERPL-MCNC: 6.1 MG/DL — HIGH (ref 2.5–4.5)
PHOSPHATE SERPL-MCNC: 6.2 MG/DL — HIGH (ref 2.5–4.5)
PHOSPHATE SERPL-MCNC: 6.2 MG/DL — HIGH (ref 2.5–4.5)
PHOSPHATE SERPL-MCNC: 8.5 MG/DL — HIGH (ref 2.5–4.5)
PHOSPHATE SERPL-MCNC: 9.2 MG/DL — HIGH (ref 2.5–4.5)
PLATELET # BLD AUTO: 105 K/UL — LOW (ref 150–400)
PLATELET # BLD AUTO: 110 K/UL — LOW (ref 150–400)
PLATELET # BLD AUTO: 112 K/UL — LOW (ref 150–400)
PLATELET # BLD AUTO: 113 K/UL — LOW (ref 150–400)
PLATELET # BLD AUTO: 116 K/UL — LOW (ref 150–400)
PLATELET # BLD AUTO: 119 K/UL — LOW (ref 150–400)
PLATELET # BLD AUTO: 164 K/UL — SIGNIFICANT CHANGE UP (ref 150–400)
PLATELET # BLD AUTO: 175 K/UL — SIGNIFICANT CHANGE UP (ref 150–400)
PLATELET # BLD AUTO: 175 K/UL — SIGNIFICANT CHANGE UP (ref 150–400)
PLATELET # BLD AUTO: 178 K/UL — SIGNIFICANT CHANGE UP (ref 150–400)
PLATELET # BLD AUTO: 23 K/UL — LOW (ref 150–400)
PLATELET # BLD AUTO: 43 K/UL — LOW (ref 150–400)
PLATELET # BLD AUTO: 49 K/UL — LOW (ref 150–400)
PLATELET # BLD AUTO: 49 K/UL — LOW (ref 150–400)
PLATELET # BLD AUTO: 52 K/UL — LOW (ref 150–400)
PLATELET # BLD AUTO: 54 K/UL — LOW (ref 150–400)
PLATELET # BLD AUTO: 54 K/UL — LOW (ref 150–400)
PLATELET # BLD AUTO: 96 K/UL — LOW (ref 150–400)
PO2 BLDA: 103 MMHG — SIGNIFICANT CHANGE UP (ref 83–108)
PO2 BLDA: 126 MMHG — HIGH (ref 83–108)
PO2 BLDA: 154 MMHG — HIGH (ref 83–108)
PO2 BLDA: 159 MMHG — HIGH (ref 83–108)
PO2 BLDA: 229 MMHG — HIGH (ref 83–108)
PO2 BLDA: 262 MMHG — HIGH (ref 83–108)
PO2 BLDA: 273 MMHG — HIGH (ref 83–108)
PO2 BLDA: 280 MMHG — HIGH (ref 83–108)
PO2 BLDA: 330 MMHG — HIGH (ref 83–108)
PO2 BLDA: 61 MMHG — LOW (ref 83–108)
PO2 BLDA: 62 MMHG — LOW (ref 83–108)
PO2 BLDA: 67 MMHG — LOW (ref 83–108)
PO2 BLDA: 69 MMHG — LOW (ref 83–108)
PO2 BLDA: 71 MMHG — LOW (ref 83–108)
PO2 BLDA: 72 MMHG — LOW (ref 83–108)
PO2 BLDA: 77 MMHG — LOW (ref 83–108)
PO2 BLDA: 79 MMHG — LOW (ref 83–108)
PO2 BLDA: 82 MMHG — LOW (ref 83–108)
PO2 BLDA: 89 MMHG — SIGNIFICANT CHANGE UP (ref 83–108)
PO2 BLDA: 96 MMHG — SIGNIFICANT CHANGE UP (ref 83–108)
POTASSIUM BLDA-SCNC: 3.4 MMOL/L — LOW (ref 3.5–4.9)
POTASSIUM BLDA-SCNC: 3.7 MMOL/L — SIGNIFICANT CHANGE UP (ref 3.5–4.9)
POTASSIUM BLDA-SCNC: 4 MMOL/L — SIGNIFICANT CHANGE UP (ref 3.5–4.9)
POTASSIUM BLDA-SCNC: 4.1 MMOL/L — SIGNIFICANT CHANGE UP (ref 3.5–4.9)
POTASSIUM BLDA-SCNC: 4.4 MMOL/L — SIGNIFICANT CHANGE UP (ref 3.5–4.9)
POTASSIUM BLDA-SCNC: 4.8 MMOL/L — SIGNIFICANT CHANGE UP (ref 3.5–4.9)
POTASSIUM BLDA-SCNC: 4.9 MMOL/L — SIGNIFICANT CHANGE UP (ref 3.5–4.9)
POTASSIUM BLDA-SCNC: 5 MMOL/L — HIGH (ref 3.5–4.9)
POTASSIUM BLDA-SCNC: 5.6 MMOL/L — HIGH (ref 3.5–4.9)
POTASSIUM SERPL-MCNC: 3.2 MMOL/L — LOW (ref 3.5–5.3)
POTASSIUM SERPL-MCNC: 3.4 MMOL/L — LOW (ref 3.5–5.3)
POTASSIUM SERPL-MCNC: 3.5 MMOL/L — SIGNIFICANT CHANGE UP (ref 3.5–5.3)
POTASSIUM SERPL-MCNC: 3.7 MMOL/L — SIGNIFICANT CHANGE UP (ref 3.5–5.3)
POTASSIUM SERPL-MCNC: 3.9 MMOL/L — SIGNIFICANT CHANGE UP (ref 3.5–5.3)
POTASSIUM SERPL-MCNC: 4.1 MMOL/L — SIGNIFICANT CHANGE UP (ref 3.5–5.3)
POTASSIUM SERPL-MCNC: 4.5 MMOL/L — SIGNIFICANT CHANGE UP (ref 3.5–5.3)
POTASSIUM SERPL-MCNC: 4.7 MMOL/L — SIGNIFICANT CHANGE UP (ref 3.5–5.3)
POTASSIUM SERPL-MCNC: 5.2 MMOL/L — SIGNIFICANT CHANGE UP (ref 3.5–5.3)
POTASSIUM SERPL-MCNC: 5.4 MMOL/L — HIGH (ref 3.5–5.3)
POTASSIUM SERPL-MCNC: 5.5 MMOL/L — HIGH (ref 3.5–5.3)
POTASSIUM SERPL-MCNC: 5.5 MMOL/L — HIGH (ref 3.5–5.3)
POTASSIUM SERPL-MCNC: 5.6 MMOL/L — HIGH (ref 3.5–5.3)
POTASSIUM SERPL-MCNC: 5.8 MMOL/L — HIGH (ref 3.5–5.3)
POTASSIUM SERPL-MCNC: 6.1 MMOL/L — HIGH (ref 3.5–5.3)
POTASSIUM SERPL-MCNC: 6.7 MMOL/L — CRITICAL HIGH (ref 3.5–5.3)
POTASSIUM SERPL-MCNC: 6.8 MMOL/L — CRITICAL HIGH (ref 3.5–5.3)
POTASSIUM SERPL-MCNC: 6.9 MMOL/L — CRITICAL HIGH (ref 3.5–5.3)
POTASSIUM SERPL-SCNC: 3.2 MMOL/L — LOW (ref 3.5–5.3)
POTASSIUM SERPL-SCNC: 3.4 MMOL/L — LOW (ref 3.5–5.3)
POTASSIUM SERPL-SCNC: 3.5 MMOL/L — SIGNIFICANT CHANGE UP (ref 3.5–5.3)
POTASSIUM SERPL-SCNC: 3.7 MMOL/L — SIGNIFICANT CHANGE UP (ref 3.5–5.3)
POTASSIUM SERPL-SCNC: 3.9 MMOL/L — SIGNIFICANT CHANGE UP (ref 3.5–5.3)
POTASSIUM SERPL-SCNC: 4.1 MMOL/L — SIGNIFICANT CHANGE UP (ref 3.5–5.3)
POTASSIUM SERPL-SCNC: 4.5 MMOL/L — SIGNIFICANT CHANGE UP (ref 3.5–5.3)
POTASSIUM SERPL-SCNC: 4.7 MMOL/L — SIGNIFICANT CHANGE UP (ref 3.5–5.3)
POTASSIUM SERPL-SCNC: 5.2 MMOL/L — SIGNIFICANT CHANGE UP (ref 3.5–5.3)
POTASSIUM SERPL-SCNC: 5.4 MMOL/L — HIGH (ref 3.5–5.3)
POTASSIUM SERPL-SCNC: 5.5 MMOL/L — HIGH (ref 3.5–5.3)
POTASSIUM SERPL-SCNC: 5.5 MMOL/L — HIGH (ref 3.5–5.3)
POTASSIUM SERPL-SCNC: 5.6 MMOL/L — HIGH (ref 3.5–5.3)
POTASSIUM SERPL-SCNC: 5.8 MMOL/L — HIGH (ref 3.5–5.3)
POTASSIUM SERPL-SCNC: 6.1 MMOL/L — HIGH (ref 3.5–5.3)
POTASSIUM SERPL-SCNC: 6.7 MMOL/L — CRITICAL HIGH (ref 3.5–5.3)
POTASSIUM SERPL-SCNC: 6.8 MMOL/L — CRITICAL HIGH (ref 3.5–5.3)
POTASSIUM SERPL-SCNC: 6.9 MMOL/L — CRITICAL HIGH (ref 3.5–5.3)
POTASSIUM UR-SCNC: 12 MMOL/L — SIGNIFICANT CHANGE UP
PREALB SERPL-MCNC: 17 MG/DL — LOW (ref 20–40)
PREALB SERPL-MCNC: 21 MG/DL — SIGNIFICANT CHANGE UP (ref 20–40)
PROT SERPL-MCNC: 3.5 G/DL — LOW (ref 6–8.3)
PROT SERPL-MCNC: 4 G/DL — LOW (ref 6–8.3)
PROT SERPL-MCNC: 4.3 G/DL — LOW (ref 6–8.3)
PROT SERPL-MCNC: 4.5 G/DL — LOW (ref 6–8.3)
PROT SERPL-MCNC: 4.7 G/DL — LOW (ref 6–8.3)
PROT SERPL-MCNC: 4.9 G/DL — LOW (ref 6–8.3)
PROT SERPL-MCNC: 5 G/DL — LOW (ref 6–8.3)
PROT SERPL-MCNC: 5.3 G/DL — LOW (ref 6–8.3)
PROT SERPL-MCNC: 6.6 G/DL — SIGNIFICANT CHANGE UP (ref 6–8.3)
PROT SERPL-MCNC: 7.4 G/DL — SIGNIFICANT CHANGE UP (ref 6–8.3)
PROT UR-MCNC: NEGATIVE MG/DL — SIGNIFICANT CHANGE UP
PROTHROM AB SERPL-ACNC: 13.6 SEC — HIGH (ref 10–12.9)
PROTHROM AB SERPL-ACNC: 14.2 SEC — HIGH (ref 10–12.9)
PROTHROM AB SERPL-ACNC: 14.2 SEC — HIGH (ref 10–12.9)
PROTHROM AB SERPL-ACNC: 14.8 SEC — HIGH (ref 10–12.9)
PROTHROM AB SERPL-ACNC: 15 SEC — HIGH (ref 10–12.9)
PROTHROM AB SERPL-ACNC: 15.1 SEC — HIGH (ref 10–12.9)
PROTHROM AB SERPL-ACNC: 17.3 SEC — HIGH (ref 10–12.9)
PROTHROM AB SERPL-ACNC: 18.7 SEC — HIGH (ref 10–12.9)
PROTHROM AB SERPL-ACNC: 22.5 SEC — HIGH (ref 10–12.9)
PROTHROM AB SERPL-ACNC: 25.7 SEC — HIGH (ref 10–12.9)
PROTHROM AB SERPL-ACNC: 27.3 SEC — HIGH (ref 10–12.9)
PROTHROM AB SERPL-ACNC: 28.3 SEC — HIGH (ref 10–12.9)
PROTHROM AB SERPL-ACNC: 29.9 SEC — HIGH (ref 10–12.9)
PROTHROM AB SERPL-ACNC: 35 SEC — HIGH (ref 10–12.9)
PROTHROM AB SERPL-ACNC: 44.6 SEC — HIGH (ref 10–12.9)
RBC # BLD: 2.8 M/UL — LOW (ref 3.8–5.2)
RBC # BLD: 3.3 M/UL — LOW (ref 3.8–5.2)
RBC # BLD: 3.31 M/UL — LOW (ref 3.8–5.2)
RBC # BLD: 3.59 M/UL — LOW (ref 3.8–5.2)
RBC # BLD: 3.69 M/UL — LOW (ref 3.8–5.2)
RBC # BLD: 4.08 M/UL — SIGNIFICANT CHANGE UP (ref 3.8–5.2)
RBC # BLD: 4.11 M/UL — SIGNIFICANT CHANGE UP (ref 3.8–5.2)
RBC # BLD: 4.13 M/UL — SIGNIFICANT CHANGE UP (ref 3.8–5.2)
RBC # BLD: 4.25 M/UL — SIGNIFICANT CHANGE UP (ref 3.8–5.2)
RBC # BLD: 4.36 M/UL — SIGNIFICANT CHANGE UP (ref 3.8–5.2)
RBC # BLD: 4.42 M/UL — SIGNIFICANT CHANGE UP (ref 3.8–5.2)
RBC # BLD: 4.42 M/UL — SIGNIFICANT CHANGE UP (ref 3.8–5.2)
RBC # BLD: 4.61 M/UL — SIGNIFICANT CHANGE UP (ref 3.8–5.2)
RBC # BLD: 4.84 M/UL — SIGNIFICANT CHANGE UP (ref 3.8–5.2)
RBC # BLD: 4.88 M/UL — SIGNIFICANT CHANGE UP (ref 3.8–5.2)
RBC # BLD: 4.89 M/UL — SIGNIFICANT CHANGE UP (ref 3.8–5.2)
RBC # BLD: 5.07 M/UL — SIGNIFICANT CHANGE UP (ref 3.8–5.2)
RBC # BLD: 5.21 M/UL — HIGH (ref 3.8–5.2)
RBC # FLD: 13.4 % — SIGNIFICANT CHANGE UP (ref 10.3–14.5)
RBC # FLD: 13.5 % — SIGNIFICANT CHANGE UP (ref 10.3–14.5)
RBC # FLD: 13.5 % — SIGNIFICANT CHANGE UP (ref 10.3–14.5)
RBC # FLD: 13.7 % — SIGNIFICANT CHANGE UP (ref 10.3–14.5)
RBC # FLD: 14.4 % — SIGNIFICANT CHANGE UP (ref 10.3–14.5)
RBC # FLD: 14.6 % — HIGH (ref 10.3–14.5)
RBC # FLD: 14.6 % — HIGH (ref 10.3–14.5)
RBC # FLD: 14.8 % — HIGH (ref 10.3–14.5)
RBC # FLD: 14.9 % — HIGH (ref 10.3–14.5)
RBC # FLD: 15.1 % — HIGH (ref 10.3–14.5)
RBC # FLD: 15.4 % — HIGH (ref 10.3–14.5)
RBC # FLD: 15.4 % — HIGH (ref 10.3–14.5)
RBC # FLD: 15.5 % — HIGH (ref 10.3–14.5)
RBC # FLD: 15.8 % — HIGH (ref 10.3–14.5)
RBC # FLD: 15.9 % — HIGH (ref 10.3–14.5)
RBC # FLD: 16 % — HIGH (ref 10.3–14.5)
RBC # FLD: 16.3 % — HIGH (ref 10.3–14.5)
RBC # FLD: 16.8 % — HIGH (ref 10.3–14.5)
RBC CASTS # UR COMP ASSIST: < 5 /HPF — SIGNIFICANT CHANGE UP
RH IG SCN BLD-IMP: POSITIVE — SIGNIFICANT CHANGE UP
SAO2 % BLDA: 88 % — LOW (ref 95–100)
SAO2 % BLDA: 89 % — LOW (ref 95–100)
SAO2 % BLDA: 89 % — LOW (ref 95–100)
SAO2 % BLDA: 93 % — LOW (ref 95–100)
SAO2 % BLDA: 94 % — LOW (ref 95–100)
SAO2 % BLDA: 94 % — LOW (ref 95–100)
SAO2 % BLDA: 95 % — SIGNIFICANT CHANGE UP (ref 95–100)
SAO2 % BLDA: 95 % — SIGNIFICANT CHANGE UP (ref 95–100)
SAO2 % BLDA: 96 % — SIGNIFICANT CHANGE UP (ref 95–100)
SAO2 % BLDA: 96 % — SIGNIFICANT CHANGE UP (ref 95–100)
SAO2 % BLDA: 98 % — SIGNIFICANT CHANGE UP (ref 95–100)
SAO2 % BLDA: 98 % — SIGNIFICANT CHANGE UP (ref 95–100)
SAO2 % BLDA: 99 % — SIGNIFICANT CHANGE UP (ref 95–100)
SODIUM BLDA-SCNC: 134 MMOL/L — LOW (ref 138–146)
SODIUM BLDA-SCNC: 134 MMOL/L — LOW (ref 138–146)
SODIUM BLDA-SCNC: 135 MMOL/L — LOW (ref 138–146)
SODIUM BLDA-SCNC: 137 MMOL/L — LOW (ref 138–146)
SODIUM BLDA-SCNC: 138 MMOL/L — SIGNIFICANT CHANGE UP (ref 138–146)
SODIUM BLDA-SCNC: 139 MMOL/L — SIGNIFICANT CHANGE UP (ref 138–146)
SODIUM BLDA-SCNC: 141 MMOL/L — SIGNIFICANT CHANGE UP (ref 138–146)
SODIUM BLDA-SCNC: 141 MMOL/L — SIGNIFICANT CHANGE UP (ref 138–146)
SODIUM BLDA-SCNC: 147 MMOL/L — HIGH (ref 138–146)
SODIUM SERPL-SCNC: 136 MMOL/L — SIGNIFICANT CHANGE UP (ref 135–145)
SODIUM SERPL-SCNC: 138 MMOL/L — SIGNIFICANT CHANGE UP (ref 135–145)
SODIUM SERPL-SCNC: 139 MMOL/L — SIGNIFICANT CHANGE UP (ref 135–145)
SODIUM SERPL-SCNC: 140 MMOL/L — SIGNIFICANT CHANGE UP (ref 135–145)
SODIUM SERPL-SCNC: 141 MMOL/L — SIGNIFICANT CHANGE UP (ref 135–145)
SODIUM SERPL-SCNC: 142 MMOL/L — SIGNIFICANT CHANGE UP (ref 135–145)
SODIUM SERPL-SCNC: 142 MMOL/L — SIGNIFICANT CHANGE UP (ref 135–145)
SODIUM SERPL-SCNC: 143 MMOL/L — SIGNIFICANT CHANGE UP (ref 135–145)
SODIUM SERPL-SCNC: 144 MMOL/L — SIGNIFICANT CHANGE UP (ref 135–145)
SODIUM SERPL-SCNC: 149 MMOL/L — HIGH (ref 135–145)
SODIUM UR-SCNC: 132 MMOL/L — SIGNIFICANT CHANGE UP
SP GR SPEC: 1.02 — SIGNIFICANT CHANGE UP (ref 1–1.03)
SP GR SPEC: <=1.005 — SIGNIFICANT CHANGE UP (ref 1–1.03)
SP GR SPEC: <=1.005 — SIGNIFICANT CHANGE UP (ref 1–1.03)
SPECIMEN SOURCE: SIGNIFICANT CHANGE UP
TROPONIN T SERPL-MCNC: 0.03 NG/ML — HIGH (ref 0–0.01)
UROBILINOGEN FLD QL: 0.2 E.U./DL — SIGNIFICANT CHANGE UP
WBC # BLD: 3.59 K/UL — LOW (ref 3.8–10.5)
WBC # BLD: 3.63 K/UL — LOW (ref 3.8–10.5)
WBC # BLD: 3.88 K/UL — SIGNIFICANT CHANGE UP (ref 3.8–10.5)
WBC # BLD: 3.94 K/UL — SIGNIFICANT CHANGE UP (ref 3.8–10.5)
WBC # BLD: 4.28 K/UL — SIGNIFICANT CHANGE UP (ref 3.8–10.5)
WBC # BLD: 4.41 K/UL — SIGNIFICANT CHANGE UP (ref 3.8–10.5)
WBC # BLD: 4.68 K/UL — SIGNIFICANT CHANGE UP (ref 3.8–10.5)
WBC # BLD: 4.85 K/UL — SIGNIFICANT CHANGE UP (ref 3.8–10.5)
WBC # BLD: 4.91 K/UL — SIGNIFICANT CHANGE UP (ref 3.8–10.5)
WBC # BLD: 5.55 K/UL — SIGNIFICANT CHANGE UP (ref 3.8–10.5)
WBC # BLD: 6.12 K/UL — SIGNIFICANT CHANGE UP (ref 3.8–10.5)
WBC # BLD: 6.31 K/UL — SIGNIFICANT CHANGE UP (ref 3.8–10.5)
WBC # BLD: 6.39 K/UL — SIGNIFICANT CHANGE UP (ref 3.8–10.5)
WBC # BLD: 7.58 K/UL — SIGNIFICANT CHANGE UP (ref 3.8–10.5)
WBC # BLD: 8.37 K/UL — SIGNIFICANT CHANGE UP (ref 3.8–10.5)
WBC # BLD: 8.88 K/UL — SIGNIFICANT CHANGE UP (ref 3.8–10.5)
WBC # BLD: 9.43 K/UL — SIGNIFICANT CHANGE UP (ref 3.8–10.5)
WBC # BLD: 9.84 K/UL — SIGNIFICANT CHANGE UP (ref 3.8–10.5)
WBC # FLD AUTO: 3.59 K/UL — LOW (ref 3.8–10.5)
WBC # FLD AUTO: 3.63 K/UL — LOW (ref 3.8–10.5)
WBC # FLD AUTO: 3.88 K/UL — SIGNIFICANT CHANGE UP (ref 3.8–10.5)
WBC # FLD AUTO: 3.94 K/UL — SIGNIFICANT CHANGE UP (ref 3.8–10.5)
WBC # FLD AUTO: 4.28 K/UL — SIGNIFICANT CHANGE UP (ref 3.8–10.5)
WBC # FLD AUTO: 4.41 K/UL — SIGNIFICANT CHANGE UP (ref 3.8–10.5)
WBC # FLD AUTO: 4.68 K/UL — SIGNIFICANT CHANGE UP (ref 3.8–10.5)
WBC # FLD AUTO: 4.85 K/UL — SIGNIFICANT CHANGE UP (ref 3.8–10.5)
WBC # FLD AUTO: 4.91 K/UL — SIGNIFICANT CHANGE UP (ref 3.8–10.5)
WBC # FLD AUTO: 5.55 K/UL — SIGNIFICANT CHANGE UP (ref 3.8–10.5)
WBC # FLD AUTO: 6.12 K/UL — SIGNIFICANT CHANGE UP (ref 3.8–10.5)
WBC # FLD AUTO: 6.31 K/UL — SIGNIFICANT CHANGE UP (ref 3.8–10.5)
WBC # FLD AUTO: 6.39 K/UL — SIGNIFICANT CHANGE UP (ref 3.8–10.5)
WBC # FLD AUTO: 7.58 K/UL — SIGNIFICANT CHANGE UP (ref 3.8–10.5)
WBC # FLD AUTO: 8.37 K/UL — SIGNIFICANT CHANGE UP (ref 3.8–10.5)
WBC # FLD AUTO: 8.88 K/UL — SIGNIFICANT CHANGE UP (ref 3.8–10.5)
WBC # FLD AUTO: 9.43 K/UL — SIGNIFICANT CHANGE UP (ref 3.8–10.5)
WBC # FLD AUTO: 9.84 K/UL — SIGNIFICANT CHANGE UP (ref 3.8–10.5)
WBC UR QL: < 5 /HPF — SIGNIFICANT CHANGE UP

## 2019-01-01 PROCEDURE — 71045 X-RAY EXAM CHEST 1 VIEW: CPT | Mod: 26,77,76

## 2019-01-01 PROCEDURE — 71045 X-RAY EXAM CHEST 1 VIEW: CPT | Mod: 26

## 2019-01-01 PROCEDURE — 35631 BPG AOR-CELIAC-MSN-RENAL: CPT | Mod: 62,GC,50

## 2019-01-01 PROCEDURE — 93010 ELECTROCARDIOGRAM REPORT: CPT

## 2019-01-01 PROCEDURE — 37617 LIGATION MAJOR ARTERY ABD: CPT | Mod: GC,62

## 2019-01-01 PROCEDURE — 93306 TTE W/DOPPLER COMPLETE: CPT | Mod: 26

## 2019-01-01 PROCEDURE — 99291 CRITICAL CARE FIRST HOUR: CPT

## 2019-01-01 PROCEDURE — 93970 EXTREMITY STUDY: CPT | Mod: 26

## 2019-01-01 PROCEDURE — 35631 BPG AOR-CELIAC-MSN-RENAL: CPT | Mod: GC,62

## 2019-01-01 PROCEDURE — 34703 EVASC RPR A-UNILAC NDGFT: CPT | Mod: 62,GC

## 2019-01-01 PROCEDURE — 99233 SBSQ HOSP IP/OBS HIGH 50: CPT

## 2019-01-01 PROCEDURE — 90947 DIALYSIS REPEATED EVAL: CPT | Mod: GC

## 2019-01-01 PROCEDURE — 35634 BPG ILIORENAL: CPT | Mod: LT

## 2019-01-01 PROCEDURE — 99222 1ST HOSP IP/OBS MODERATE 55: CPT

## 2019-01-01 PROCEDURE — 99232 SBSQ HOSP IP/OBS MODERATE 35: CPT

## 2019-01-01 PROCEDURE — 93018 CV STRESS TEST I&R ONLY: CPT

## 2019-01-01 PROCEDURE — 35633 BPG ILIO-MESENTERIC: CPT

## 2019-01-01 PROCEDURE — 38100 REMOVAL OF SPLEEN TOTAL: CPT | Mod: GC,62

## 2019-01-01 PROCEDURE — 74019 RADEX ABDOMEN 2 VIEWS: CPT | Mod: 26

## 2019-01-01 PROCEDURE — 78452 HT MUSCLE IMAGE SPECT MULT: CPT | Mod: 26

## 2019-01-01 PROCEDURE — 38100 REMOVAL OF SPLEEN TOTAL: CPT | Mod: 62,GC

## 2019-01-01 PROCEDURE — 99223 1ST HOSP IP/OBS HIGH 75: CPT | Mod: GC

## 2019-01-01 PROCEDURE — 34703 EVASC RPR A-UNILAC NDGFT: CPT | Mod: GC,62

## 2019-01-01 PROCEDURE — 99232 SBSQ HOSP IP/OBS MODERATE 35: CPT | Mod: GC

## 2019-01-01 PROCEDURE — 76770 US EXAM ABDO BACK WALL COMP: CPT | Mod: 26

## 2019-01-01 PROCEDURE — 37617 LIGATION MAJOR ARTERY ABD: CPT | Mod: 62,GC

## 2019-01-01 PROCEDURE — 93016 CV STRESS TEST SUPVJ ONLY: CPT

## 2019-01-01 PROCEDURE — 99233 SBSQ HOSP IP/OBS HIGH 50: CPT | Mod: GC

## 2019-01-01 PROCEDURE — 99223 1ST HOSP IP/OBS HIGH 75: CPT | Mod: 25,GC

## 2019-01-01 RX ORDER — IPRATROPIUM/ALBUTEROL SULFATE 18-103MCG
3 AEROSOL WITH ADAPTER (GRAM) INHALATION ONCE
Refills: 0 | Status: COMPLETED | OUTPATIENT
Start: 2019-01-01 | End: 2019-01-01

## 2019-01-01 RX ORDER — PIPERACILLIN AND TAZOBACTAM 4; .5 G/20ML; G/20ML
2.25 INJECTION, POWDER, LYOPHILIZED, FOR SOLUTION INTRAVENOUS EVERY 6 HOURS
Refills: 0 | Status: DISCONTINUED | OUTPATIENT
Start: 2019-01-01 | End: 2019-01-01

## 2019-01-01 RX ORDER — HYDRALAZINE HCL 50 MG
10 TABLET ORAL ONCE
Refills: 0 | Status: COMPLETED | OUTPATIENT
Start: 2019-01-01 | End: 2019-01-01

## 2019-01-01 RX ORDER — AMLODIPINE BESYLATE 2.5 MG/1
2.5 TABLET ORAL DAILY
Refills: 0 | Status: DISCONTINUED | OUTPATIENT
Start: 2019-01-01 | End: 2019-01-01

## 2019-01-01 RX ORDER — ALTEPLASE 100 MG
2 KIT INTRAVENOUS ONCE
Refills: 0 | Status: COMPLETED | OUTPATIENT
Start: 2019-01-01 | End: 2019-01-01

## 2019-01-01 RX ORDER — DEXTROSE 50 % IN WATER 50 %
25 SYRINGE (ML) INTRAVENOUS ONCE
Refills: 0 | Status: DISCONTINUED | OUTPATIENT
Start: 2019-01-01 | End: 2019-01-01

## 2019-01-01 RX ORDER — SODIUM CHLORIDE 9 MG/ML
1000 INJECTION INTRAMUSCULAR; INTRAVENOUS; SUBCUTANEOUS
Refills: 0 | Status: DISCONTINUED | OUTPATIENT
Start: 2019-01-01 | End: 2019-01-01

## 2019-01-01 RX ORDER — PROPOFOL 10 MG/ML
14.62 INJECTION, EMULSION INTRAVENOUS
Qty: 1000 | Refills: 0 | Status: DISCONTINUED | OUTPATIENT
Start: 2019-01-01 | End: 2019-01-01

## 2019-01-01 RX ORDER — SODIUM CHLORIDE 9 MG/ML
500 INJECTION, SOLUTION INTRAVENOUS ONCE
Refills: 0 | Status: COMPLETED | OUTPATIENT
Start: 2019-01-01 | End: 2019-01-01

## 2019-01-01 RX ORDER — HYDRALAZINE HCL 50 MG
5 TABLET ORAL ONCE
Refills: 0 | Status: COMPLETED | OUTPATIENT
Start: 2019-01-01 | End: 2019-01-01

## 2019-01-01 RX ORDER — SODIUM CHLORIDE 9 MG/ML
200 INJECTION, SOLUTION INTRAVENOUS ONCE
Refills: 0 | Status: COMPLETED | OUTPATIENT
Start: 2019-01-01 | End: 2019-01-01

## 2019-01-01 RX ORDER — LACTULOSE 10 G/15ML
20 SOLUTION ORAL EVERY 6 HOURS
Refills: 0 | Status: DISCONTINUED | OUTPATIENT
Start: 2019-01-01 | End: 2019-01-01

## 2019-01-01 RX ORDER — HYDROCORTISONE 20 MG
50 TABLET ORAL EVERY 8 HOURS
Refills: 0 | Status: DISCONTINUED | OUTPATIENT
Start: 2019-01-01 | End: 2019-01-01

## 2019-01-01 RX ORDER — GLUCAGON INJECTION, SOLUTION 0.5 MG/.1ML
1 INJECTION, SOLUTION SUBCUTANEOUS ONCE
Refills: 0 | Status: DISCONTINUED | OUTPATIENT
Start: 2019-01-01 | End: 2019-01-01

## 2019-01-01 RX ORDER — NOREPINEPHRINE BITARTRATE/D5W 8 MG/250ML
0.05 PLASTIC BAG, INJECTION (ML) INTRAVENOUS
Qty: 32 | Refills: 0 | Status: DISCONTINUED | OUTPATIENT
Start: 2019-01-01 | End: 2019-01-01

## 2019-01-01 RX ORDER — NOREPINEPHRINE BITARTRATE/D5W 8 MG/250ML
0.05 PLASTIC BAG, INJECTION (ML) INTRAVENOUS
Qty: 8 | Refills: 0 | Status: DISCONTINUED | OUTPATIENT
Start: 2019-01-01 | End: 2019-01-01

## 2019-01-01 RX ORDER — SODIUM CHLORIDE 9 MG/ML
1000 INJECTION, SOLUTION INTRAVENOUS
Refills: 0 | Status: DISCONTINUED | OUTPATIENT
Start: 2019-01-01 | End: 2019-01-01

## 2019-01-01 RX ORDER — CALCIUM GLUCONATE 100 MG/ML
2 VIAL (ML) INTRAVENOUS ONCE
Refills: 0 | Status: DISCONTINUED | OUTPATIENT
Start: 2019-01-01 | End: 2019-01-01

## 2019-01-01 RX ORDER — DEXTROSE 10 % IN WATER 10 %
1000 INTRAVENOUS SOLUTION INTRAVENOUS
Refills: 0 | Status: DISCONTINUED | OUTPATIENT
Start: 2019-01-01 | End: 2019-01-01

## 2019-01-01 RX ORDER — FENTANYL CITRATE 50 UG/ML
0.5 INJECTION INTRAVENOUS
Qty: 2500 | Refills: 0 | Status: DISCONTINUED | OUTPATIENT
Start: 2019-01-01 | End: 2019-01-01

## 2019-01-01 RX ORDER — VASOPRESSIN 20 [USP'U]/ML
0.04 INJECTION INTRAVENOUS
Qty: 50 | Refills: 0 | Status: DISCONTINUED | OUTPATIENT
Start: 2019-01-01 | End: 2019-01-01

## 2019-01-01 RX ORDER — CEFTRIAXONE 500 MG/1
INJECTION, POWDER, FOR SOLUTION INTRAMUSCULAR; INTRAVENOUS
Refills: 0 | Status: DISCONTINUED | OUTPATIENT
Start: 2019-01-01 | End: 2019-01-01

## 2019-01-01 RX ORDER — DEXTROSE 50 % IN WATER 50 %
15 SYRINGE (ML) INTRAVENOUS ONCE
Refills: 0 | Status: DISCONTINUED | OUTPATIENT
Start: 2019-01-01 | End: 2019-01-01

## 2019-01-01 RX ORDER — CHLORHEXIDINE GLUCONATE 213 G/1000ML
15 SOLUTION TOPICAL
Refills: 0 | Status: DISCONTINUED | OUTPATIENT
Start: 2019-01-01 | End: 2019-01-01

## 2019-01-01 RX ORDER — PIPERACILLIN AND TAZOBACTAM 4; .5 G/20ML; G/20ML
2.25 INJECTION, POWDER, LYOPHILIZED, FOR SOLUTION INTRAVENOUS ONCE
Refills: 0 | Status: COMPLETED | OUTPATIENT
Start: 2019-01-01 | End: 2019-01-01

## 2019-01-01 RX ORDER — PHENYLEPHRINE HYDROCHLORIDE 10 MG/ML
1 INJECTION INTRAVENOUS
Qty: 160 | Refills: 0 | Status: DISCONTINUED | OUTPATIENT
Start: 2019-01-01 | End: 2019-01-01

## 2019-01-01 RX ORDER — MAGNESIUM SULFATE 500 MG/ML
2 VIAL (ML) INJECTION ONCE
Refills: 0 | Status: COMPLETED | OUTPATIENT
Start: 2019-01-01 | End: 2019-01-01

## 2019-01-01 RX ORDER — AMLODIPINE BESYLATE 2.5 MG/1
5 TABLET ORAL DAILY
Refills: 0 | Status: DISCONTINUED | OUTPATIENT
Start: 2019-01-01 | End: 2019-01-01

## 2019-01-01 RX ORDER — DOCUSATE SODIUM 100 MG
100 CAPSULE ORAL THREE TIMES A DAY
Refills: 0 | Status: DISCONTINUED | OUTPATIENT
Start: 2019-01-01 | End: 2019-01-01

## 2019-01-01 RX ORDER — SODIUM BICARBONATE 1 MEQ/ML
100 SYRINGE (ML) INTRAVENOUS ONCE
Refills: 0 | Status: COMPLETED | OUTPATIENT
Start: 2019-01-01 | End: 2019-01-01

## 2019-01-01 RX ORDER — POTASSIUM CHLORIDE 20 MEQ
40 PACKET (EA) ORAL EVERY 4 HOURS
Refills: 0 | Status: COMPLETED | OUTPATIENT
Start: 2019-01-01 | End: 2019-01-01

## 2019-01-01 RX ORDER — PIPERACILLIN AND TAZOBACTAM 4; .5 G/20ML; G/20ML
INJECTION, POWDER, LYOPHILIZED, FOR SOLUTION INTRAVENOUS
Refills: 0 | Status: DISCONTINUED | OUTPATIENT
Start: 2019-01-01 | End: 2019-01-01

## 2019-01-01 RX ORDER — SODIUM CHLORIDE 9 MG/ML
1000 INJECTION INTRAMUSCULAR; INTRAVENOUS; SUBCUTANEOUS ONCE
Refills: 0 | Status: COMPLETED | OUTPATIENT
Start: 2019-01-01 | End: 2019-01-01

## 2019-01-01 RX ORDER — INSULIN LISPRO 100/ML
VIAL (ML) SUBCUTANEOUS
Refills: 0 | Status: DISCONTINUED | OUTPATIENT
Start: 2019-01-01 | End: 2019-01-01

## 2019-01-01 RX ORDER — CEFAZOLIN SODIUM 1 G
1000 VIAL (EA) INJECTION EVERY 8 HOURS
Refills: 0 | Status: DISCONTINUED | OUTPATIENT
Start: 2019-01-01 | End: 2019-01-01

## 2019-01-01 RX ORDER — CHLORHEXIDINE GLUCONATE 213 G/1000ML
15 SOLUTION TOPICAL EVERY 12 HOURS
Refills: 0 | Status: DISCONTINUED | OUTPATIENT
Start: 2019-01-01 | End: 2019-01-01

## 2019-01-01 RX ORDER — INFLUENZA VIRUS VACCINE 15; 15; 15; 15 UG/.5ML; UG/.5ML; UG/.5ML; UG/.5ML
0.5 SUSPENSION INTRAMUSCULAR ONCE
Refills: 0 | Status: DISCONTINUED | OUTPATIENT
Start: 2019-01-01 | End: 2019-01-01

## 2019-01-01 RX ORDER — SODIUM CHLORIDE 9 MG/ML
500 INJECTION INTRAMUSCULAR; INTRAVENOUS; SUBCUTANEOUS ONCE
Refills: 0 | Status: COMPLETED | OUTPATIENT
Start: 2019-01-01 | End: 2019-01-01

## 2019-01-01 RX ORDER — INSULIN HUMAN 100 [IU]/ML
10 INJECTION, SOLUTION SUBCUTANEOUS ONCE
Refills: 0 | Status: COMPLETED | OUTPATIENT
Start: 2019-01-01 | End: 2019-01-01

## 2019-01-01 RX ORDER — MORPHINE SULFATE 50 MG/1
2 CAPSULE, EXTENDED RELEASE ORAL ONCE
Refills: 0 | Status: DISCONTINUED | OUTPATIENT
Start: 2019-01-01 | End: 2019-01-01

## 2019-01-01 RX ORDER — DEXTROSE 50 % IN WATER 50 %
25 SYRINGE (ML) INTRAVENOUS ONCE
Refills: 0 | Status: COMPLETED | OUTPATIENT
Start: 2019-01-01 | End: 2019-01-01

## 2019-01-01 RX ORDER — ENOXAPARIN SODIUM 100 MG/ML
40 INJECTION SUBCUTANEOUS EVERY 24 HOURS
Refills: 0 | Status: DISCONTINUED | OUTPATIENT
Start: 2019-01-01 | End: 2019-01-01

## 2019-01-01 RX ORDER — HYDROCORTISONE 20 MG
50 TABLET ORAL EVERY 6 HOURS
Refills: 0 | Status: DISCONTINUED | OUTPATIENT
Start: 2019-01-01 | End: 2019-01-01

## 2019-01-01 RX ORDER — CEFTRIAXONE 500 MG/1
1000 INJECTION, POWDER, FOR SOLUTION INTRAMUSCULAR; INTRAVENOUS EVERY 24 HOURS
Refills: 0 | Status: DISCONTINUED | OUTPATIENT
Start: 2019-01-01 | End: 2019-01-01

## 2019-01-01 RX ORDER — LABETALOL HCL 100 MG
10 TABLET ORAL ONCE
Refills: 0 | Status: COMPLETED | OUTPATIENT
Start: 2019-01-01 | End: 2019-01-01

## 2019-01-01 RX ORDER — CALCIUM GLUCONATE 100 MG/ML
2 VIAL (ML) INTRAVENOUS ONCE
Refills: 0 | Status: COMPLETED | OUTPATIENT
Start: 2019-01-01 | End: 2019-01-01

## 2019-01-01 RX ORDER — ACETAMINOPHEN 500 MG
1000 TABLET ORAL ONCE
Refills: 0 | Status: COMPLETED | OUTPATIENT
Start: 2019-01-01 | End: 2019-01-01

## 2019-01-01 RX ORDER — INSULIN LISPRO 100/ML
10 VIAL (ML) SUBCUTANEOUS ONCE
Refills: 0 | Status: DISCONTINUED | OUTPATIENT
Start: 2019-01-01 | End: 2019-01-01

## 2019-01-01 RX ORDER — VANCOMYCIN HCL 1 G
1000 VIAL (EA) INTRAVENOUS ONCE
Refills: 0 | Status: COMPLETED | OUTPATIENT
Start: 2019-01-01 | End: 2019-01-01

## 2019-01-01 RX ORDER — PANTOPRAZOLE SODIUM 20 MG/1
40 TABLET, DELAYED RELEASE ORAL DAILY
Refills: 0 | Status: DISCONTINUED | OUTPATIENT
Start: 2019-01-01 | End: 2019-01-01

## 2019-01-01 RX ORDER — POTASSIUM PHOSPHATE, MONOBASIC POTASSIUM PHOSPHATE, DIBASIC 236; 224 MG/ML; MG/ML
21 INJECTION, SOLUTION INTRAVENOUS ONCE
Refills: 0 | Status: COMPLETED | OUTPATIENT
Start: 2019-01-01 | End: 2019-01-01

## 2019-01-01 RX ORDER — CLEVIDIPINE BUTYRATE 50MG/100ML
4 VIAL (ML) INTRAVENOUS
Qty: 25 | Refills: 0 | Status: DISCONTINUED | OUTPATIENT
Start: 2019-01-01 | End: 2019-01-01

## 2019-01-01 RX ORDER — POTASSIUM CHLORIDE 20 MEQ
10 PACKET (EA) ORAL
Refills: 0 | Status: COMPLETED | OUTPATIENT
Start: 2019-01-01 | End: 2019-01-01

## 2019-01-01 RX ORDER — ACETAMINOPHEN 500 MG
650 TABLET ORAL EVERY 6 HOURS
Refills: 0 | Status: DISCONTINUED | OUTPATIENT
Start: 2019-01-01 | End: 2019-01-01

## 2019-01-01 RX ORDER — CEFTRIAXONE 500 MG/1
1000 INJECTION, POWDER, FOR SOLUTION INTRAMUSCULAR; INTRAVENOUS ONCE
Refills: 0 | Status: COMPLETED | OUTPATIENT
Start: 2019-01-01 | End: 2019-01-01

## 2019-01-01 RX ORDER — CHLORHEXIDINE GLUCONATE 213 G/1000ML
1 SOLUTION TOPICAL DAILY
Refills: 0 | Status: DISCONTINUED | OUTPATIENT
Start: 2019-01-01 | End: 2019-01-01

## 2019-01-01 RX ORDER — DEXTROSE 50 % IN WATER 50 %
12.5 SYRINGE (ML) INTRAVENOUS ONCE
Refills: 0 | Status: DISCONTINUED | OUTPATIENT
Start: 2019-01-01 | End: 2019-01-01

## 2019-01-01 RX ORDER — CEFAZOLIN SODIUM 1 G
1000 VIAL (EA) INJECTION EVERY 12 HOURS
Refills: 0 | Status: COMPLETED | OUTPATIENT
Start: 2019-01-01 | End: 2019-01-01

## 2019-01-01 RX ORDER — VASOPRESSIN 20 [USP'U]/ML
0.02 INJECTION INTRAVENOUS
Qty: 50 | Refills: 0 | Status: DISCONTINUED | OUTPATIENT
Start: 2019-01-01 | End: 2019-01-01

## 2019-01-01 RX ORDER — POTASSIUM CHLORIDE 20 MEQ
20 PACKET (EA) ORAL
Refills: 0 | Status: COMPLETED | OUTPATIENT
Start: 2019-01-01 | End: 2019-01-01

## 2019-01-01 RX ADMIN — Medication 1000 MILLIGRAM(S): at 23:10

## 2019-01-01 RX ADMIN — Medication 10 MILLIGRAM(S): at 10:20

## 2019-01-01 RX ADMIN — Medication 400 MILLIGRAM(S): at 22:40

## 2019-01-01 RX ADMIN — CHLORHEXIDINE GLUCONATE 15 MILLILITER(S): 213 SOLUTION TOPICAL at 07:24

## 2019-01-01 RX ADMIN — CEFTRIAXONE 100 MILLIGRAM(S): 500 INJECTION, POWDER, FOR SOLUTION INTRAMUSCULAR; INTRAVENOUS at 22:07

## 2019-01-01 RX ADMIN — Medication 100 MILLIEQUIVALENT(S): at 05:00

## 2019-01-01 RX ADMIN — MORPHINE SULFATE 2 MILLIGRAM(S): 50 CAPSULE, EXTENDED RELEASE ORAL at 09:00

## 2019-01-01 RX ADMIN — POTASSIUM PHOSPHATE, MONOBASIC POTASSIUM PHOSPHATE, DIBASIC 62.5 MILLIMOLE(S): 236; 224 INJECTION, SOLUTION INTRAVENOUS at 11:00

## 2019-01-01 RX ADMIN — CHLORHEXIDINE GLUCONATE 15 MILLILITER(S): 213 SOLUTION TOPICAL at 07:04

## 2019-01-01 RX ADMIN — Medication 50 MILLIEQUIVALENT(S): at 07:38

## 2019-01-01 RX ADMIN — SODIUM CHLORIDE 1200 MILLILITER(S): 9 INJECTION, SOLUTION INTRAVENOUS at 03:24

## 2019-01-01 RX ADMIN — Medication 10 MILLIGRAM(S): at 14:25

## 2019-01-01 RX ADMIN — PIPERACILLIN AND TAZOBACTAM 200 GRAM(S): 4; .5 INJECTION, POWDER, LYOPHILIZED, FOR SOLUTION INTRAVENOUS at 03:19

## 2019-01-01 RX ADMIN — Medication 8 MG/HR: at 15:11

## 2019-01-01 RX ADMIN — ENOXAPARIN SODIUM 40 MILLIGRAM(S): 100 INJECTION SUBCUTANEOUS at 20:07

## 2019-01-01 RX ADMIN — Medication 3 MILLILITER(S): at 15:35

## 2019-01-01 RX ADMIN — Medication 5 MILLIGRAM(S): at 17:57

## 2019-01-01 RX ADMIN — PIPERACILLIN AND TAZOBACTAM 200 GRAM(S): 4; .5 INJECTION, POWDER, LYOPHILIZED, FOR SOLUTION INTRAVENOUS at 16:14

## 2019-01-01 RX ADMIN — Medication 5 MILLIGRAM(S): at 16:33

## 2019-01-01 RX ADMIN — PROPOFOL 5 MICROGRAM(S)/KG/MIN: 10 INJECTION, EMULSION INTRAVENOUS at 05:53

## 2019-01-01 RX ADMIN — Medication 50 MILLIGRAM(S): at 22:36

## 2019-01-01 RX ADMIN — PANTOPRAZOLE SODIUM 40 MILLIGRAM(S): 20 TABLET, DELAYED RELEASE ORAL at 16:14

## 2019-01-01 RX ADMIN — Medication 100 MILLIGRAM(S): at 07:38

## 2019-01-01 RX ADMIN — Medication 100 MILLIEQUIVALENT(S): at 03:18

## 2019-01-01 RX ADMIN — INSULIN HUMAN 10 UNIT(S): 100 INJECTION, SOLUTION SUBCUTANEOUS at 16:00

## 2019-01-01 RX ADMIN — CHLORHEXIDINE GLUCONATE 15 MILLILITER(S): 213 SOLUTION TOPICAL at 18:26

## 2019-01-01 RX ADMIN — SODIUM CHLORIDE 1000 MILLILITER(S): 9 INJECTION INTRAMUSCULAR; INTRAVENOUS; SUBCUTANEOUS at 08:48

## 2019-01-01 RX ADMIN — AMLODIPINE BESYLATE 5 MILLIGRAM(S): 2.5 TABLET ORAL at 14:24

## 2019-01-01 RX ADMIN — SODIUM CHLORIDE 75 MILLILITER(S): 9 INJECTION, SOLUTION INTRAVENOUS at 05:54

## 2019-01-01 RX ADMIN — CHLORHEXIDINE GLUCONATE 15 MILLILITER(S): 213 SOLUTION TOPICAL at 05:49

## 2019-01-01 RX ADMIN — Medication 100 MILLIGRAM(S): at 05:19

## 2019-01-01 RX ADMIN — LACTULOSE 20 GRAM(S): 10 SOLUTION ORAL at 00:33

## 2019-01-01 RX ADMIN — MORPHINE SULFATE 2 MILLIGRAM(S): 50 CAPSULE, EXTENDED RELEASE ORAL at 09:13

## 2019-01-01 RX ADMIN — CEFTRIAXONE 100 MILLIGRAM(S): 500 INJECTION, POWDER, FOR SOLUTION INTRAMUSCULAR; INTRAVENOUS at 22:55

## 2019-01-01 RX ADMIN — Medication 50 GRAM(S): at 23:42

## 2019-01-01 RX ADMIN — Medication 25 GRAM(S): at 15:42

## 2019-01-01 RX ADMIN — PIPERACILLIN AND TAZOBACTAM 200 GRAM(S): 4; .5 INJECTION, POWDER, LYOPHILIZED, FOR SOLUTION INTRAVENOUS at 22:35

## 2019-01-01 RX ADMIN — CEFTRIAXONE 100 MILLIGRAM(S): 500 INJECTION, POWDER, FOR SOLUTION INTRAMUSCULAR; INTRAVENOUS at 21:59

## 2019-01-01 RX ADMIN — AMLODIPINE BESYLATE 5 MILLIGRAM(S): 2.5 TABLET ORAL at 07:28

## 2019-01-01 RX ADMIN — Medication 50 MILLIGRAM(S): at 07:23

## 2019-01-01 RX ADMIN — Medication 8 MG/HR: at 05:51

## 2019-01-01 RX ADMIN — Medication 50 MILLIGRAM(S): at 03:18

## 2019-01-01 RX ADMIN — Medication 100 MILLIGRAM(S): at 17:35

## 2019-01-01 RX ADMIN — Medication 25 GRAM(S): at 06:54

## 2019-01-01 RX ADMIN — SODIUM CHLORIDE 2000 MILLILITER(S): 9 INJECTION, SOLUTION INTRAVENOUS at 04:30

## 2019-01-01 RX ADMIN — Medication 40 MILLILITER(S): at 07:25

## 2019-01-01 RX ADMIN — Medication 8 MG/HR: at 23:15

## 2019-01-01 RX ADMIN — Medication 100 MILLIGRAM(S): at 01:00

## 2019-01-01 RX ADMIN — Medication 50 MILLIEQUIVALENT(S): at 05:49

## 2019-01-01 RX ADMIN — ALTEPLASE 2 MILLIGRAM(S): KIT at 22:08

## 2019-01-01 RX ADMIN — Medication 100 MILLIGRAM(S): at 21:54

## 2019-01-01 RX ADMIN — Medication 40 MILLIEQUIVALENT(S): at 05:19

## 2019-01-01 RX ADMIN — Medication 50 MILLIEQUIVALENT(S): at 03:26

## 2019-01-01 RX ADMIN — Medication 400 GRAM(S): at 08:06

## 2019-01-01 RX ADMIN — CHLORHEXIDINE GLUCONATE 15 MILLILITER(S): 213 SOLUTION TOPICAL at 19:56

## 2019-01-01 RX ADMIN — PANTOPRAZOLE SODIUM 40 MILLIGRAM(S): 20 TABLET, DELAYED RELEASE ORAL at 14:56

## 2019-01-01 RX ADMIN — PROPOFOL 5 MICROGRAM(S)/KG/MIN: 10 INJECTION, EMULSION INTRAVENOUS at 21:10

## 2019-01-01 RX ADMIN — ENOXAPARIN SODIUM 40 MILLIGRAM(S): 100 INJECTION SUBCUTANEOUS at 21:58

## 2019-01-01 RX ADMIN — Medication 400 GRAM(S): at 15:50

## 2019-01-01 RX ADMIN — AMLODIPINE BESYLATE 2.5 MILLIGRAM(S): 2.5 TABLET ORAL at 06:34

## 2019-01-01 RX ADMIN — FENTANYL CITRATE 2.85 MICROGRAM(S)/KG/HR: 50 INJECTION INTRAVENOUS at 05:54

## 2019-01-01 RX ADMIN — SODIUM CHLORIDE 60 MILLILITER(S): 9 INJECTION INTRAMUSCULAR; INTRAVENOUS; SUBCUTANEOUS at 19:39

## 2019-01-01 RX ADMIN — SODIUM CHLORIDE 80 MILLILITER(S): 9 INJECTION INTRAMUSCULAR; INTRAVENOUS; SUBCUTANEOUS at 00:11

## 2019-01-01 RX ADMIN — Medication 50 MILLIGRAM(S): at 16:15

## 2019-01-01 RX ADMIN — LACTULOSE 20 GRAM(S): 10 SOLUTION ORAL at 07:03

## 2019-01-01 RX ADMIN — Medication 40 MILLIEQUIVALENT(S): at 21:54

## 2019-01-01 RX ADMIN — Medication 10 MILLIGRAM(S): at 23:41

## 2019-01-01 RX ADMIN — ALTEPLASE 2 MILLIGRAM(S): KIT at 21:59

## 2019-01-01 RX ADMIN — SODIUM CHLORIDE 500 MILLILITER(S): 9 INJECTION INTRAMUSCULAR; INTRAVENOUS; SUBCUTANEOUS at 11:00

## 2019-01-01 RX ADMIN — Medication 250 MILLIGRAM(S): at 16:00

## 2019-01-01 RX ADMIN — ENOXAPARIN SODIUM 40 MILLIGRAM(S): 100 INJECTION SUBCUTANEOUS at 20:28

## 2019-01-01 RX ADMIN — ENOXAPARIN SODIUM 40 MILLIGRAM(S): 100 INJECTION SUBCUTANEOUS at 21:09

## 2019-01-01 RX ADMIN — Medication 5 MILLIGRAM(S): at 01:01

## 2019-01-01 RX ADMIN — Medication 10 MILLIGRAM(S): at 23:02

## 2019-01-01 RX ADMIN — AMLODIPINE BESYLATE 2.5 MILLIGRAM(S): 2.5 TABLET ORAL at 18:57

## 2019-01-01 RX ADMIN — SODIUM CHLORIDE 2000 MILLILITER(S): 9 INJECTION, SOLUTION INTRAVENOUS at 23:40

## 2019-10-17 NOTE — H&P ADULT - HISTORY OF PRESENT ILLNESS
84F is transferred from [place] for complaints about LLQ abdominal pain because of her 84F is transferred from [place] for complaints about LLQ abdominal pain and known diagnosis of thoracoabdominal aortic aneurysm. She was diagnosed in [facility] in 2018. She states that the pain is intermittent, sharp, and non radiating. Sometimes it is bad enough to wake her up from her sleep. She also occasionally feels it beating in her stomach, describing it as a "palpitation" feeling, She denies any substernal chest pain or shortness of breath. Denies any cramping sensation with ambulation, and is actually able to ambulate very far without any difficulty. Denies any past strokes, renal issues, or diagnosis of diabetes. Has never smoked. Was diagnosed with HTN in the past for which she was given medication (unable to state what) but has not taken it for over 1 year. Also notes having occasional headaches, without any significant temporal pattern.

## 2019-10-17 NOTE — H&P ADULT - NSHPPHYSICALEXAM_GEN_ALL_CORE
Gen: NAD  C/V: NSR  Pulm: Nonlabored breathing, no respiratory distress  Abd: soft, ND, tender to palpation in LLQ, pulsatile mass in LLQ  Extrem: palpable DP bilaterally, biphasic PT bilaterally, nonedematous, warm to palpation

## 2019-10-17 NOTE — H&P ADULT - ASSESSMENT
84F w/ HTN not managed with medication now here with LLQ abdominal pain related to her thoracoabdominal aortic aneurysm, planned for OR on Monday    - Cardiology consult  - Medicine consult  - Pharm Stress test in AM  - Regular diet, NPO after midnight  - IVF after midnight  - lovenox  - EKG  - AM Labs

## 2019-10-18 NOTE — PROGRESS NOTE ADULT - SUBJECTIVE AND OBJECTIVE BOX
O/N: OWEN, VSS                              84F w/ HTN not managed with medication now here with LLQ abdominal pain related to her thoracoabdominal aortic aneurysm, planned for OR on Monday    - Cardiology consult  - Medicine consult  - Pharm Stress test in AM  - Regular diet, NPO after midnight for stress test  - IVF after midnight  - lovenox  - EKG  - AM Labs O/N: OWEN, VSS    enoxaparin Injectable 40      Vital Signs Last 24 Hrs  T(C): 36.4 (18 Oct 2019 09:10), Max: 36.9 (18 Oct 2019 06:13)  T(F): 97.6 (18 Oct 2019 09:10), Max: 98.5 (18 Oct 2019 06:13)  HR: 54 (18 Oct 2019 08:27) (54 - 75)  BP: 133/85 (18 Oct 2019 08:27) (109/56 - 137/87)  BP(mean): --  RR: 18 (18 Oct 2019 08:27) (18 - 18)  SpO2: 98% (18 Oct 2019 08:27) (96% - 98%)  I&O's Summary    17 Oct 2019 07:01  -  18 Oct 2019 07:00  --------------------------------------------------------  IN: 0 mL / OUT: 400 mL / NET: -400 mL        Physical Exam:  General: NAD, resting comfortably in bed  Pulmonary: Nonlabored breathing, no respiratory distress  Cardiovascular:  Abd: soft, ND, tender to palpation in LLQ, pulsatile mass in LLQ  Extrem: palpable DP bilaterally, biphasic PT bilaterally, nonedematous, warm to palpation      LABS:                        11.8   4.28  )-----------( 164      ( 18 Oct 2019 06:29 )             36.5     10-18    141  |  111<H>  |  21  ----------------------------<  97  4.5   |  19<L>  |  1.03    Ca    8.4      18 Oct 2019 06:29  Phos  3.3     10-18  Mg     2.0     10-18    TPro  6.6  /  Alb  3.6  /  TBili  0.4  /  DBili  x   /  AST  13  /  ALT  6<L>  /  AlkPhos  98  10-18    PT/INR - ( 17 Oct 2019 19:09 )   PT: 14.2 sec;   INR: 1.25          PTT - ( 17 Oct 2019 19:09 )  PTT:37.7 sec

## 2019-10-18 NOTE — PROGRESS NOTE ADULT - SUBJECTIVE AND OBJECTIVE BOX
Patient is a 84y old  Female who presents with a chief complaint of Thoracoabdominal Aneurysm (18 Oct 2019 06:30)      HPI:  84F is transferred from [place] for complaints about LLQ abdominal pain and known diagnosis of thoracoabdominal aortic aneurysm. She was diagnosed in [facility] in 2018. She states that the pain is intermittent, sharp, and non radiating. Sometimes it is bad enough to wake her up from her sleep. She also occasionally feels it beating in her stomach, describing it as a "palpitation" feeling, She denies any substernal chest pain or shortness of breath. Denies any cramping sensation with ambulation, and is actually able to ambulate very far without any difficulty. Denies any past strokes, renal issues, or diagnosis of diabetes. Has never smoked. Was diagnosed with HTN in the past for which she was given medication (unable to state what) but has not taken it for over 1 year. Also notes having occasional headaches, without any significant temporal pattern. (17 Oct 2019 19:25)      PAST MEDICAL & SURGICAL HISTORY:  Hypertension  No significant past surgical history      PREVIOUS DIAGNOSTIC TESTING:      ECHO  FINDINGS:    STRESS  FINDINGS:    CATHETERIZATION  FINDINGS:    MEDICATIONS  (STANDING):  docusate sodium 100 milliGRAM(s) Oral three times a day  enoxaparin Injectable 40 milliGRAM(s) SubCutaneous every 24 hours  sodium chloride 0.9%. 1000 milliLiter(s) (60 mL/Hr) IV Continuous <Continuous>    MEDICATIONS  (PRN):  acetaminophen   Tablet .. 650 milliGRAM(s) Oral every 6 hours PRN Moderate Pain (4 - 6)      FAMILY HISTORY:  No pertinent family history in first degree relatives      SOCIAL HISTORY:    CIGARETTES: no    ALCOHOL:  no    REVIEW OF SYSTEMS:  CONSTITUTIONAL: No fever, weight loss, or fatigue  EYES: No eye pain, visual disturbances, or discharge  ENMT:  No difficulty hearing, tinnitus, vertigo; No sinus or throat pain  NECK: No pain or stiffness  RESPIRATORY: No cough, wheezing, chills or hemoptysis; No Shortness of Breath  CARDIOVASCULAR: No chest pain, palpitations, passing out, dizziness, or leg swelling  GASTROINTESTINAL: ++abdominal  pain. No nausea, vomiting, or hematemesis; No diarrhea or constipation. No melena or hematochezia.  GENITOURINARY: No dysuria, frequency, hematuria, or incontinence  NEUROLOGICAL: No headaches, memory loss, loss of strength, numbness, or tremors  SKIN: No itching, burning, rashes, or lesions   LYMPH Nodes: No enlarged glands  ENDOCRINE: No heat or cold intolerance; No hair loss  MUSCULOSKELETAL: No joint pain or swelling; No muscle, back, or extremity pain  PSYCHIATRIC: No depression, anxiety, mood swings, or difficulty sleeping  HEME/LYMPH: No easy bruising, or bleeding gums  ALLERY AND IMMUNOLOGIC: No hives or eczema	  Vital Signs Last 24 Hrs  T(C): 36.9 (18 Oct 2019 06:13), Max: 36.9 (18 Oct 2019 06:13)  T(F): 98.5 (18 Oct 2019 06:13), Max: 98.5 (18 Oct 2019 06:13)  HR: 75 (18 Oct 2019 04:00) (63 - 75)  BP: 120/55 (18 Oct 2019 04:00) (109/56 - 137/87)  BP(mean): --  RR: 18 (18 Oct 2019 04:00) (18 - 18)  SpO2: 98% (18 Oct 2019 04:00) (96% - 98%)    PHYSICAL EXAM:  Appearance: Normal	  HEENT:   Normal oral mucosa, PERRL, EOMI	  Lymphatic: No lymphadenopathy  Cardiovascular: Normal S1 S2, No JVD, No murmurs, No edema  Respiratory: Lungs clear to auscultation	  Psychiatry: A & O x 3, Mood & affect appropriate  Gastrointestinal:  Soft, pulsatile aorta in the LLQ, + BS	  Skin: No rashes, No ecchymoses, No cyanosis  Neurologic: Non-focal  Extremities: Normal range of motion, No clubbing, cyanosis or edema  Vascular: Peripheral pulses palpable 2+ bilaterally      INTERPRETATION OF TELEMETRY:    ECG:    I&O's Detail    17 Oct 2019 07:01  -  18 Oct 2019 07:00  --------------------------------------------------------  IN:  Total IN: 0 mL    OUT:    Voided: 400 mL  Total OUT: 400 mL    Total NET: -400 mL          LABS:                        11.8   4.28  )-----------( 164      ( 18 Oct 2019 06:29 )             36.5     10-18    141  |  111<H>  |  21  ----------------------------<  97  4.5   |  19<L>  |  1.03    Ca    8.4      18 Oct 2019 06:29  Phos  3.3     10-18  Mg     2.0     10-18    TPro  6.6  /  Alb  3.6  /  TBili  0.4  /  DBili  x   /  AST  13  /  ALT  6<L>  /  AlkPhos  98  10-18        PT/INR - ( 17 Oct 2019 19:09 )   PT: 14.2 sec;   INR: 1.25          PTT - ( 17 Oct 2019 19:09 )  PTT:37.7 sec    I&O's Summary    17 Oct 2019 07:01  -  18 Oct 2019 07:00  --------------------------------------------------------  IN: 0 mL / OUT: 400 mL / NET: -400 mL      BNP  RADIOLOGY & ADDITIONAL STUDIES:

## 2019-10-18 NOTE — PROGRESS NOTE ADULT - ASSESSMENT
84F w/ HTN not managed with medication now here with LLQ abdominal pain related to her thoracoabdominal aortic aneurysm, planned for OR on Monday    - NPO for Nuc Stress Test  - Echo  - f/u Cardiology recs  - f/u Medicine recs  - f/u renal recs  - Hydralazine for SBP < 140  - IVF until regular diet  - lovenox

## 2019-10-19 NOTE — PROGRESS NOTE ADULT - SUBJECTIVE AND OBJECTIVE BOX
Chief Complaint/Reason for Consult: periop cv mgmt  INTERVAL HPI: stress reviewed - normal, tele reviewed no events, pt denies cp sob  	  MEDICATIONS:    cefTRIAXone   IVPB      cefTRIAXone   IVPB 1000 milliGRAM(s) IV Intermittent every 24 hours      acetaminophen   Tablet .. 650 milliGRAM(s) Oral every 6 hours PRN        enoxaparin Injectable 40 milliGRAM(s) SubCutaneous every 24 hours  influenza   Vaccine 0.5 milliLiter(s) IntraMuscular once      REVIEW OF SYSTEMS:  [x] As per HPI  CONSTITUTIONAL: No fever, weight loss, or fatigue  RESPIRATORY: No cough, wheezing, chills or hemoptysis; No Shortness of Breath  CARDIOVASCULAR: No chest pain, palpitations, dizziness, or leg swelling  GASTROINTESTINAL: No abdominal or epigastric pain. No nausea, vomiting, or hematemesis; No diarrhea or constipation. No melena or hematochezia.  MUSCULOSKELETAL: No joint pain or swelling; No muscle, back, or extremity pain  [x] All others negative	  [ ] Unable to obtain    PHYSICAL EXAM:  T(C): 37.1 (10-19-19 @ 14:02), Max: 37.3 (10-18-19 @ 20:52)  HR: 71 (10-19-19 @ 12:07) (59 - 86)  BP: 144/90 (10-19-19 @ 12:07) (103/67 - 196/86)  RR: 18 (10-19-19 @ 08:28) (16 - 18)  SpO2: 96% (10-19-19 @ 12:07) (94% - 97%)  Wt(kg): --  I&O's Summary    18 Oct 2019 07:01  -  19 Oct 2019 07:00  --------------------------------------------------------  IN: 240 mL / OUT: 300 mL / NET: -60 mL    19 Oct 2019 07:01  -  19 Oct 2019 14:29  --------------------------------------------------------  IN: 300 mL / OUT: 200 mL / NET: 100 mL          Appearance: Normal	  HEENT:   Normal oral mucosa  Cardiovascular: Normal S1 S2, No JVD, No murmurs, No edema  Respiratory: Lungs clear to auscultation	  Gastrointestinal:  Soft, Non-tender, + BS	  Extremities: Normal range of motion, No clubbing, cyanosis or edema  Vascular: Peripheral pulses palpable 2+ bilaterally    TELEMETRY: 	    ECG: < from: 12 Lead ECG (10.17.19 @ 19:21) >  Ventricular Rate 65 BPM    Atrial Rate 65 BPM    P-R Interval 176 ms    QRS Duration 88 ms    Q-T Interval 442 ms    QTC Calculation(Bezet) 459 ms    P Axis 7 degrees    R Axis -18 degrees    T Axis 238 degrees    Diagnosis Line Normal sinus rhythm  Cannot rule out Inferior infarct , age undetermined  ST - T  abnormalities  Confirmed by SILVIANO HOGUE NEIL ([id number]) on 10/18/2019 11:41:41 AM    < end of copied text >    	  RADIOLOGY:   CXR: < from: Xray Chest 1 View AP/PA (10.17.19 @ 19:35) >  INTERPRETATION:  Clinical history/reason for exam: Preop.    Single frontal view.    No comparison. Bibasilar focal atelectasis. Otherwise, clear lungs.   Heart, mediastinum and thorax are unremarkable. Large bowel contrast   material.            Thank you for the opportunity to participate in the care of this patient.        < end of copied text >    CT:  US:    CARDIAC TESTING:  Echocardiogram: < from: Echocardiogram (10.18.19 @ 10:40) >   1. Normal left and right ventricular size and systolic function.   2. Grade I left ventricular diastolic dysfunction without elevated   filling pressure.   3. Mildly dilated left atrium.   4. Aortic sclerosis.   5. Mild aortic regurgitation.   6. No pericardial effusion.      < end of copied text >    Catheterization:  Stress Test:      LABS:	 	    CARDIAC MARKERS:                                  11.8   4.28  )-----------( 164      ( 18 Oct 2019 06:29 )             36.5     10-18    141  |  111<H>  |  21  ----------------------------<  97  4.5   |  19<L>  |  1.03    Ca    8.4      18 Oct 2019 06:29  Phos  3.3     10-18  Mg     2.0     10-18    TPro  6.6  /  Alb  3.6  /  TBili  0.4  /  DBili  x   /  AST  13  /  ALT  6<L>  /  AlkPhos  98  10-18    proBNP:   Lipid Profile:   HgA1c:   TSH:     ASSESSMENT/PLAN: 	    # Cardiac stable to proceed to surgery  Normal pharm nuc stress test  echo normal EF  not on BB no need to initiate immediately pre op  HR/BP at goal - labile but no need for antihtn regimen now, will follow BP post op  non diabetic.

## 2019-10-19 NOTE — PROGRESS NOTE ADULT - ASSESSMENT
84F w/ HTN not managed with medication now here with LLQ abdominal pain related to her thoracoabdominal aortic aneurysm, planned for OR on Monday    - Pain/nausea control PRN  - f/u Cardiology recs  - f/u Medicine recs  - f/u renal recs  - Hydralazine for SBP > 140  - IVF until regular diet  - Lovenox SubQ

## 2019-10-19 NOTE — PROGRESS NOTE ADULT - SUBJECTIVE AND OBJECTIVE BOX
24 hr events  O/N: UA positive for LE, added on Urine Microscopy, showed many WBC, wbc clumps & bacteria. Sent urine culture, started IV ceftriaxone (1g q24 per pharmacist). Nose bleed & SBP of 190, gave 5 of hydralazine. Nose bleed resolved, pressures down to 120-140.   10/18: got ECHO (EF55%) normal, stress test normal, post test 's - hydralazine 5mg given    Subjective:  No acute complaints. No abdominal pain. Denies CP/SOB. Denies N/V.    Vital Signs Last 24 Hrs  T(C): 37.1 (19 Oct 2019 14:02), Max: 37.3 (18 Oct 2019 20:52)  T(F): 98.8 (19 Oct 2019 14:02), Max: 99.1 (18 Oct 2019 20:52)  HR: 71 (19 Oct 2019 12:07) (59 - 86)  BP: 144/90 (19 Oct 2019 12:07) (103/67 - 196/86)  BP(mean): --  RR: 17 (19 Oct 2019 17:27) (17 - 18)  SpO2: 96% (19 Oct 2019 12:07) (95% - 96%)    I&O's Summary  18 Oct 2019 07:01  -  19 Oct 2019 07:00  --------------------------------------------------------  IN: 240 mL / OUT: 300 mL / NET: -60 mL    19 Oct 2019 07:01  -  19 Oct 2019 17:30  --------------------------------------------------------  IN: 300 mL / OUT: 200 mL / NET: 100 mL    Physical Exam:  General: NAD, resting comfortably  Pulmonary: normal resp effort  Abdominal: soft, non-distended, mild suprapubic tenderness  Neuro: A/O x 3    LABS:                      11.8   4.28  )-----------( 164      ( 18 Oct 2019 06:29 )             36.5     10-18  141  |  111<H>  |  21  ----------------------------<  97  4.5   |  19<L>  |  1.03    Ca    8.4      18 Oct 2019 06:29  Phos  3.3     10-  Mg     2.0     10-    TPro  6.6  /  Alb  3.6  /  TBili  0.4  /  DBili  x   /  AST  13  /  ALT  6<L>  /  AlkPhos  98  10-18    PT/INR - ( 17 Oct 2019 19:09 )   PT: 14.2 sec;   INR: 1.25     PTT - ( 17 Oct 2019 19:09 )  PTT:37.7 sec    Urinalysis Basic - ( 18 Oct 2019 21:35 )  Color: Yellow / Appearance: SL Cloudy / S.020 / pH: x  Gluc: x / Ketone: NEGATIVE  / Bili: Negative / Urobili: 0.2 E.U./dL   Blood: x / Protein: NEGATIVE mg/dL / Nitrite: NEGATIVE   Leuk Esterase: Small / RBC: < 5 /HPF / WBC Many /HPF   Sq Epi: x / Non Sq Epi: 0-5 /HPF / Bacteria: Present /HPF    LIVER FUNCTIONS - ( 18 Oct 2019 06:29 )  Alb: 3.6 g/dL / Pro: 6.6 g/dL / ALK PHOS: 98 U/L / ALT: 6 U/L / AST: 13 U/L / GGT: x           CAPILLARY BLOOD GLUCOSE  POCT Blood Glucose.: 109 mg/dL (19 Oct 2019 16:51)

## 2019-10-19 NOTE — PROGRESS NOTE ADULT - SUBJECTIVE AND OBJECTIVE BOX
Patient is a 84y old  Female who presents with a chief complaint of Thoracoabdominal Aneurysm (19 Oct 2019 17:30)      INTERVAL HPI/OVERNIGHT EVENTS:    Pt. seen and examined this morning  Pt. has no complaints  Episode of epistaxis O/N; self-resolved, per report    Review of Systems: 12 point review of systems otherwise negative    MEDICATIONS  (STANDING):  cefTRIAXone   IVPB      cefTRIAXone   IVPB 1000 milliGRAM(s) IV Intermittent every 24 hours  enoxaparin Injectable 40 milliGRAM(s) SubCutaneous every 24 hours  influenza   Vaccine 0.5 milliLiter(s) IntraMuscular once    MEDICATIONS  (PRN):  acetaminophen   Tablet .. 650 milliGRAM(s) Oral every 6 hours PRN Moderate Pain (4 - 6)      Allergies    No Known Allergies    Intolerances          Vital Signs Last 24 Hrs  T(C): 37.1 (19 Oct 2019 14:02), Max: 37.3 (18 Oct 2019 20:52)  T(F): 98.8 (19 Oct 2019 14:02), Max: 99.1 (18 Oct 2019 20:52)  HR: 71 (19 Oct 2019 12:07) (59 - 86)  BP: 144/90 (19 Oct 2019 12:07) (103/67 - 196/86)  BP(mean): --  RR: 17 (19 Oct 2019 17:27) (17 - 18)  SpO2: 96% (19 Oct 2019 12:07) (95% - 96%)  CAPILLARY BLOOD GLUCOSE      POCT Blood Glucose.: 109 mg/dL (19 Oct 2019 16:51)      10-18 @ :01  -  10-19 @ 07:00  --------------------------------------------------------  IN: 240 mL / OUT: 300 mL / NET: -60 mL    10-19 @ :  -  10-19 @ 17:47  --------------------------------------------------------  IN: 300 mL / OUT: 200 mL / NET: 100 mL        Physical Exam:  (this morning)  Daily     Daily   General:  well-appearing in NAD  HEENT: MMM  CV:  RRR, no JVD  Lungs:  CTA B/L  Abdomen: soft NT ND  Extremities:  no edema B/L LE  Skin:  WWP  Neuro:  AAOx2, forgetful    LABS:                        11.8   4.28  )-----------( 164      ( 18 Oct 2019 06:29 )             36.5     10-18    141  |  111<H>  |  21  ----------------------------<  97  4.5   |  19<L>  |  1.03    Ca    8.4      18 Oct 2019 06:29  Phos  3.3     10-18  Mg     2.0     10-18    TPro  6.6  /  Alb  3.6  /  TBili  0.4  /  DBili  x   /  AST  13  /  ALT  6<L>  /  AlkPhos  98  10-18    PT/INR - ( 17 Oct 2019 19:09 )   PT: 14.2 sec;   INR: 1.25          PTT - ( 17 Oct 2019 19:09 )  PTT:37.7 sec  Urinalysis Basic - ( 18 Oct 2019 21:35 )    Color: Yellow / Appearance: SL Cloudy / S.020 / pH: x  Gluc: x / Ketone: NEGATIVE  / Bili: Negative / Urobili: 0.2 E.U./dL   Blood: x / Protein: NEGATIVE mg/dL / Nitrite: NEGATIVE   Leuk Esterase: Small / RBC: < 5 /HPF / WBC Many /HPF   Sq Epi: x / Non Sq Epi: 0-5 /HPF / Bacteria: Present /HPF          RADIOLOGY & ADDITIONAL TESTS:    ---------------------------------------------------------------------------  I personally reviewed: [  ]EKG   [  ]CXR    [  ] CT    [  ]Other  ---------------------------------------------------------------------------  PLEASE CHECK WHEN PRESENT:     [  ]Heart Failure     [  ] Acute     [  ] Acute on Chronic     [  ] Chronic  -------------------------------------------------------------------     [  ]Diastolic [HFpEF]     [  ]Systolic [HFrEF]     [  ]Combined [HFpEF & HFrEF]     [  ]Other:  -------------------------------------------------------------------  [  ]CARSON     [  ]ATN     [  ]Reneal Medullary Necrosis     [  ]Renal Cortical Necrosis     [  ]Other Pathological Lesions:    [  ]CKD 1  [  ]CKD 2  [  ]CKD 3  [  ]CKD 4  [  ]CKD 5  [  ]Other  -------------------------------------------------------------------  [  ]Other/Unspecified:    --------------------------------------------------------------------    Abdominal Nutritional Status  [  ]Malnutrition: See Nutrition Note  [  ]Cachexia  [  ]Other:   [  ]Supplement Ordered:  [  ]Morbid Obesity (BMI >=40]

## 2019-10-19 NOTE — PROVIDER CONTACT NOTE (CHANGE IN STATUS NOTIFICATION) - ACTION/TREATMENT ORDERED:
MD Layo Lawson notified and aware MD Layo Lawson notified and aware- Hydralazine IVP ordered - Amlodipine 2.5mg ordered for daily

## 2019-10-20 NOTE — PROGRESS NOTE ADULT - SUBJECTIVE AND OBJECTIVE BOX
Patient is a 84y old  Female who presents with a chief complaint of Thoracoabdominal Aneurysm (20 Oct 2019 12:24)      INTERVAL HPI/OVERNIGHT EVENTS:    Pt. seen and examined this morning  Pt.'s family at bedside  Pt. feels well, no complaints  SBP has been labile, >200 at times    Review of Systems: 12 point review of systems otherwise negative    MEDICATIONS  (STANDING):  amLODIPine   Tablet 5 milliGRAM(s) Oral daily  cefTRIAXone   IVPB 1000 milliGRAM(s) IV Intermittent every 24 hours  cefTRIAXone   IVPB      enoxaparin Injectable 40 milliGRAM(s) SubCutaneous every 24 hours  influenza   Vaccine 0.5 milliLiter(s) IntraMuscular once    MEDICATIONS  (PRN):  acetaminophen   Tablet .. 650 milliGRAM(s) Oral every 6 hours PRN Moderate Pain (4 - 6)      Allergies    No Known Allergies    Intolerances          Vital Signs Last 24 Hrs  T(C): 36.4 (20 Oct 2019 12:27), Max: 37.2 (20 Oct 2019 06:12)  T(F): 97.6 (20 Oct 2019 12:27), Max: 98.9 (20 Oct 2019 06:12)  HR: 76 (20 Oct 2019 15:11) (63 - 84)  BP: 106/71 (20 Oct 2019 15:11) (106/71 - 228/99)  BP(mean): --  RR: 16 (20 Oct 2019 15:11) (16 - 17)  SpO2: 98% (20 Oct 2019 15:11) (96% - 99%)  CAPILLARY BLOOD GLUCOSE          10-19 @ 07:  -  10-20 @ 07:00  --------------------------------------------------------  IN: 300 mL / OUT: 550 mL / NET: -250 mL    10 @ 07:  -  10-20 @ 16:55  --------------------------------------------------------  IN: 360 mL / OUT: 750 mL / NET: -390 mL        Physical Exam:  (this morning)  Daily     Daily   General:  well-appearing in NAD, smiling  HEENT: MMM  CV:  RRR, no JVD  Abdomen: soft NT ND  Extremities:  no edema B/L LE  Skin:  WWP  Neuro:  AAOx2, forgetful    LABS:                        11.9   3.94  )-----------( 175      ( 20 Oct 2019 07:55 )             37.4     10-20    141  |  111<H>  |  25<H>  ----------------------------<  102<H>  4.1   |  20<L>  |  1.03    Ca    8.8      20 Oct 2019 07:55  Phos  3.8     10-20  Mg     1.9     10-20        Urinalysis Basic - ( 18 Oct 2019 21:35 )    Color: Yellow / Appearance: SL Cloudy / S.020 / pH: x  Gluc: x / Ketone: NEGATIVE  / Bili: Negative / Urobili: 0.2 E.U./dL   Blood: x / Protein: NEGATIVE mg/dL / Nitrite: NEGATIVE   Leuk Esterase: Small / RBC: < 5 /HPF / WBC Many /HPF   Sq Epi: x / Non Sq Epi: 0-5 /HPF / Bacteria: Present /HPF          RADIOLOGY & ADDITIONAL TESTS:    ---------------------------------------------------------------------------  I personally reviewed: [  ]EKG   [  ]CXR    [  ] CT    [  ]Other  ---------------------------------------------------------------------------  PLEASE CHECK WHEN PRESENT:     [  ]Heart Failure     [  ] Acute     [  ] Acute on Chronic     [  ] Chronic  -------------------------------------------------------------------     [  ]Diastolic [HFpEF]     [  ]Systolic [HFrEF]     [  ]Combined [HFpEF & HFrEF]     [  ]Other:  -------------------------------------------------------------------  [  ]CARSON     [  ]ATN     [  ]Reneal Medullary Necrosis     [  ]Renal Cortical Necrosis     [  ]Other Pathological Lesions:    [  ]CKD 1  [  ]CKD 2  [  ]CKD 3  [  ]CKD 4  [  ]CKD 5  [  ]Other  -------------------------------------------------------------------  [  ]Other/Unspecified:    --------------------------------------------------------------------    Abdominal Nutritional Status  [  ]Malnutrition: See Nutrition Note  [  ]Cachexia  [  ]Other:   [  ]Supplement Ordered:  [  ]Morbid Obesity (BMI >=40]

## 2019-10-20 NOTE — PROGRESS NOTE ADULT - SUBJECTIVE AND OBJECTIVE BOX
PRE-OP NOTE, Vascular Surgery, PCN:     Pre-op Diagnosis: THORACOABDOMINAL ANEURYS  THORACOABDOMINAL ANEURYSM    Procedure:  Throcoabdominal aneurysm repair - possible debranching and endorepair   Surgeon: Dr. Tavarez                          11.9   3.94  )-----------( 175      ( 20 Oct 2019 07:55 )             37.4     10-20    141  |  111<H>  |  25<H>  ----------------------------<  102<H>  4.1   |  20<L>  |  1.03    Ca    8.8      20 Oct 2019 07:55  Phos  3.8     10-20  Mg     1.9     10-20        Urinalysis Basic - ( 18 Oct 2019 21:35 )    Color: Yellow / Appearance: SL Cloudy / S.020 / pH: x  Gluc: x / Ketone: NEGATIVE  / Bili: Negative / Urobili: 0.2 E.U./dL   Blood: x / Protein: NEGATIVE mg/dL / Nitrite: NEGATIVE   Leuk Esterase: Small / RBC: < 5 /HPF / WBC Many /HPF   Sq Epi: x / Non Sq Epi: 0-5 /HPF / Bacteria: Present /HPF      CAPILLARY BLOOD GLUCOSE      POCT Blood Glucose.: 109 mg/dL (19 Oct 2019 16:51)        Type & Screen:ABO Interpretation: O (10-20 @ 05:30)    CXR:   EKG:   Echocardiogram:        A/P: 84y Female pre-op for above procedure  -NPO past midnight  - 4U PRBC on hold  - IVF past midnight  - consent:  ok  - CXR ok  - EKG ok - cards cleared

## 2019-10-20 NOTE — PROGRESS NOTE ADULT - SUBJECTIVE AND OBJECTIVE BOX
O/N: got consent from HCP (granddaughter); VSS  10/19: UA with e.coli;Cardiology cleared                                      84F with HTN, admitted for treatment of a thoracoabdominal aortic aneurysm (Winn IV)    - Pain/Nausea control PRN  - Tight BP control - Amlodipine and PRN IV medications  - Regular diet  - Ceftriaxone - for UTI  - Lovenox SubQ  - Plan for OR Monday O/N: got consent from HCP (granddaughter); VSS  10/19: UA with e.coli;Cardiology cleared          Patient is a 84y old  Female who presents with a chief complaint of Thoracoabdominal Aneurysm (20 Oct 2019 05:31)      INTERVAL HPI/OVERNIGHT EVENTS:  Patient was seen and examined at bedside. No acute event overnight. No complaints.      Review of Systems: 12 point review of systems otherwise negative      MEDICATIONS  (STANDING):  amLODIPine   Tablet 2.5 milliGRAM(s) Oral daily  cefTRIAXone   IVPB 1000 milliGRAM(s) IV Intermittent every 24 hours  cefTRIAXone   IVPB      enoxaparin Injectable 40 milliGRAM(s) SubCutaneous every 24 hours  influenza   Vaccine 0.5 milliLiter(s) IntraMuscular once    MEDICATIONS  (PRN):  acetaminophen   Tablet .. 650 milliGRAM(s) Oral every 6 hours PRN Moderate Pain (4 - 6)      Allergies    No Known Allergies    Intolerances          Vital Signs Last 24 Hrs  T(C): 36.4 (20 Oct 2019 08:52), Max: 37.2 (20 Oct 2019 06:12)  T(F): 97.5 (20 Oct 2019 08:52), Max: 98.9 (20 Oct 2019 06:12)  HR: 70 (20 Oct 2019 10:45) (63 - 76)  BP: 145/67 (20 Oct 2019 10:45) (130/65 - 221/100)  BP(mean): --  RR: 16 (20 Oct 2019 10:45) (16 - 17)  SpO2: 99% (20 Oct 2019 10:45) (96% - 99%)  CAPILLARY BLOOD GLUCOSE      POCT Blood Glucose.: 109 mg/dL (19 Oct 2019 16:51)      10-19 @ :  -  10-20 @ 07:00  --------------------------------------------------------  IN: 300 mL / OUT: 550 mL / NET: -250 mL    10-20 @ 07:  -  10-20 @ 12:22  --------------------------------------------------------  IN: 180 mL / OUT: 250 mL / NET: -70 mL        Physical Exam:    Daily     Daily   General:  Well appearing, NAD  HEENT:  Nonicteric, PERRLA  CV:  RRR, no murmur, no JVD  Lungs:  CTA B/L, no wheezes, rales, rhonchi  Abdomen:  Soft, non-tender, no distended, no suprapubic tenderness. No palpable masses in the abdomen  Extremities:  2+ pulses, no c/c, no edema  Skin:  Warm and dry, no rashes  :  No rucker  Neuro:  AAOx3    LABS:                        11.9   3.94  )-----------( 175      ( 20 Oct 2019 07:55 )             37.4     10-20    141  |  111<H>  |  25<H>  ----------------------------<  102<H>  4.1   |  20<L>  |  1.03    Ca    8.8      20 Oct 2019 07:55  Phos  3.8     10-20  Mg     1.9     10-20        Urinalysis Basic - ( 18 Oct 2019 21:35 )    Color: Yellow / Appearance: SL Cloudy / S.020 / pH: x  Gluc: x / Ketone: NEGATIVE  / Bili: Negative / Urobili: 0.2 E.U./dL   Blood: x / Protein: NEGATIVE mg/dL / Nitrite: NEGATIVE   Leuk Esterase: Small / RBC: < 5 /HPF / WBC Many /HPF   Sq Epi: x / Non Sq Epi: 0-5 /HPF / Bacteria: Present /HPF          RADIOLOGY & ADDITIONAL TESTS:    ---------------------------------------------------------------------------  I personally reviewed: [  ]EKG   [  ]CXR    [  ] CT    [  ]Other  ---------------------------------------------------------------------------  PLEASE CHECK WHEN PRESENT:     [  ]Heart Failure     [  ] Acute     [  ] Acute on Chronic     [  ] Chronic  -------------------------------------------------------------------     [  ]Diastolic [HFpEF]     [  ]Systolic [HFrEF]     [  ]Combined [HFpEF & HFrEF]     [  ]Other:  -------------------------------------------------------------------  [ ] Respiratory failure  [ ] Acute cor pulmonale  [ ] Asthma/COPD Exacerbation  [ ] Pleural effusion  [ ] Aspiration pneumonia  -------------------------------------------------------------------  [  ]CARSON     [  ]ATN     [  ]Reneal Medullary Necrosis     [  ]Renal Cortical Necrosis     [  ]Other Pathological Lesions:    [  ]CKD 1  [  ]CKD 2  [  ]CKD 3  [  ]CKD 4  [  ]CKD 5  [  ]Other  -------------------------------------------------------------------  [  ]Diabetes  [  ] Diabetic PVD Ulcer  [  ] Neuropathic ulcer to DM  [  ] Diabetes with Nephropathy  [  ] Osteomyelitis due to diabetes  --------------------------------------------------------------------  [  ]Malnutrition: See Nutrition Note  [  ]Cachexia  [  ]Other:   [  ]Supplement Ordered:  [  ]Morbid Obesity (BMI >=40]  ---------------------------------------------------------------------  [ ] Sepsis/severe sepsis/septic shock  [ ] UTI  [ ] Pneumonia  -----------------------------------------------------------------------  [ ] Acidosis/alkalosis  [ ] Fluid overload  [ ] Hypokalemia  [ ] Hyperkalemia  [ ] Hypomagnesemia  [ ] Hypophosphatemia  [ ] Hyperphosphatemia  ------------------------------------------------------------------------  [ ] Acute blood loss anemia  [ ] Post op blood loss anemia  [ ] Iron deficiency anemia  [ ] Anemia due to chronic disease  [ ] Hypercoagulable state  ----------------------------------------------------------------------  [ ] Cerebral infarction  [ ] Transient ischemia attack  [ ] Encephalopathy      Assessment/Plan: 84y Female                                          84F with HTN, admitted for treatment of a thoracoabdominal aortic aneurysm (Winn IV)    - Pain/Nausea control PRN  - Tight BP control - Amlodipine and PRN IV medications  - Regular diet - NPo After midnight for OR tomorrow  - Ceftriaxone - for UTI  - Lovenox SubQ  - Plan for OR Monday  - Preop check

## 2019-10-21 NOTE — CONSULT NOTE ADULT - ASSESSMENT
84F with HTN, admitted for treatment of a thoracoabdominal aortic aneurysm (Winn IV), s/p ex lap TEVAR with visceral debranching (R ELSY to bilateral renal and SMA bypass), splenectomy for large venous bleeding).    Neuro: fentanyl/propofol for sedation  CV: Echo 55%, normal stress test; LR @75, goal -160  Pulm: intubated, vent 40/350/12/5  FENGI: NPO, NGT, PPI  ID: ancef x3 doses  Endo: ISS  PPx: SCDs, hold SQH  Lines: RIJ introducer w/ 2 ports (10/21 - ), L a-line (10/21 - )  Wounds/drains: DANAE LUQ (10/21 - ), L groin cutdown, R 5Fr sheath (removed), midline incision  PT/OT: not ordered
84F is transferred from [place] for complaints about LLQ abdominal pain and known diagnosis of thoracoabdominal aortic aneurysm. hx of HTN not on meds    a/p:  1. HTN-labile bp, s/p hydralazine 5mg iv, would consider low dose norvasc 2.5mg if persistent high.   2. AAA- scheduled for OR on monday. cards following. stress test/echo/ekg reviewed     med will cont to follow.

## 2019-10-21 NOTE — CONSULT NOTE ADULT - SUBJECTIVE AND OBJECTIVE BOX
Patient is a 84y Female admitted for repair of thoracoabdominal aneurysm repair  Concern for perioperative CARSON    PAST MEDICAL & SURGICAL HISTORY:  Hypertension  No significant past surgical history      MEDICATIONS  (STANDING):  enoxaparin Injectable 40 milliGRAM(s) SubCutaneous every 24 hours    MEDICATIONS  (PRN):  acetaminophen   Tablet .. 650 milliGRAM(s) Oral every 6 hours PRN Moderate Pain (4 - 6)      Allergies    No Known Allergies            SOCIAL HISTORY: no smoke    FAMILY HISTORY:  No pertinent family history in first degree relatives      T(C): , Max: 36.9 (10-18-19 @ 06:13)  T(F): , Max: 98.5 (10-18-19 @ 06:13)  HR: 70 (10-18-19 @ 17:09)  BP: 151/74 (10-18-19 @ 17:09)  BP(mean): --  RR: 16 (10-18-19 @ 17:09)  SpO2: 94% (10-18-19 @ 17:09)  Wt(kg): --    10-17 @ 07:01  -  10-18 @ 07:00  --------------------------------------------------------  IN:  Total IN: 0 mL    OUT:    Voided: 400 mL  Total OUT: 400 mL    Total NET: -400 mL            PHYSICAL EXAM:  Constitutional: Well appearing.  No acute distress  Back: No CVA tenderness  Respiratory: Clear to auscultation   Cardiovascular: S1, S2.  Regular rate and rhythm.    Gastrointestinal: soft, non-tender  Extremities: Warm.  No lower extremity edema.    Neurological: No focal deficits.  Skin: Warm. Dry.    Psychiatric: Normal affect.        LABS:                        11.8   4.28  )-----------( 164      ( 18 Oct 2019 06:29 )             36.5     10-18    141  |  111<H>  |  21  ----------------------------<  97  4.5   |  19<L>  |  1.03    Ca    8.4      18 Oct 2019 06:29  Phos  3.3     10-18  Mg     2.0     10-18    TPro  6.6  /  Alb  3.6  /  TBili  0.4  /  DBili  x   /  AST  13  /  ALT  6<L>  /  AlkPhos  98  10-18      PT/INR - ( 17 Oct 2019 19:09 )   PT: 14.2 sec;   INR: 1.25          PTT - ( 17 Oct 2019 19:09 )  PTT:37.7 sec          RADIOLOGY & ADDITIONAL STUDIES:
SICU CONSULT NOTE    84 F PMH of HTN (not on home meds), diagnosed with thoracoabdominal aortic aneurysm dx in 2018 in [facility], recently complaining of increasing abdominal pain and plan for aortic aneurysm repair admitted 10/17 for preop optimization. Pt preopped and cleared by cardiology with Echo EF 55%, normal stress test. Pt found to have Ecoli UTI s/p Ceftriaxone, essential hypertension managed with IV Hydralazine and Amlodipine. Now s/p ex-lap, TEVAR with visceral debranching (R ELSY to bilateral renal and SMA bypass), splenectomy; EBL 3L, crystalloid 5.5L, 1261 cellsaver, 7uPRBC, 2FFP, 1plts, 1 cryo, 500 albumin. Admitted to SICU intubated and for hemodynamic monitoring and neuro-monitoring.    PAST MEDICAL & SURGICAL HISTORY:  Hypertension  No significant past surgical history      PAST SURGICAL HISTORY:     REVIEW OF SYSTEMS:   Pertinent positives/negatives noted in HPI.     HOME MEDICATIONS:    ALLERGIES:  Allergies    No Known Allergies    Intolerances        SOCIAL HISTORY:    FAMILY HISTORY:  No pertinent family history in first degree relatives      Vital Signs Last 24 Hrs  T(C): 35.6 (21 Oct 2019 22:07), Max: 37.1 (21 Oct 2019 05:38)  T(F): 96.1 (21 Oct 2019 22:07), Max: 98.8 (21 Oct 2019 05:38)  HR: 71 (21 Oct 2019 09:00) (62 - 71)  BP: 164/78 (21 Oct 2019 09:00) (141/70 - 164/78)  BP(mean): --  RR: 16 (21 Oct 2019 09:00) (16 - 18)  SpO2: 97% (21 Oct 2019 09:00) (96% - 99%)    PHYSICAL EXAM:  General: NAD  HEENT: ET tube in place  Heart: RRR  Pulm: vented  Abd: soft, distended, appropriately tender, no guarding or rebound  Ext: WWP, 2+ DP pulses  Neuro: sedated    LABS:                        12.3   3.88  )-----------( 175      ( 21 Oct 2019 07:32 )             37.9     10-21    143  |  110<H>  |  18  ----------------------------<  109<H>  3.9   |  22  |  0.87    Ca    8.9      21 Oct 2019 07:32  Phos  4.2     10-21  Mg     2.0     10-21      PT/INR - ( 21 Oct 2019 20:12 )   PT: 28.3 sec;   INR: 2.44          PTT - ( 21 Oct 2019 20:12 )  PTT:104.1 sec
HIPOLITO BELL  84y  Female    Patient is a 84y old  Female who presents with a chief complaint of Thoracoabdominal Aneurysm (18 Oct 2019 08:00)      HPI:  84F is transferred from [place] for complaints about LLQ abdominal pain and known diagnosis of thoracoabdominal aortic aneurysm. She was diagnosed in [facility] in 2018. She states that the pain is intermittent, sharp, and non radiating. Sometimes it is bad enough to wake her up from her sleep. She also occasionally feels it beating in her stomach, describing it as a "palpitation" feeling, She denies any substernal chest pain or shortness of breath. Denies any cramping sensation with ambulation, and is actually able to ambulate very far without any difficulty. Denies any past strokes, renal issues, or diagnosis of diabetes. Has never smoked. Was diagnosed with HTN in the past for which she was given medication (unable to state what) but has not taken it for over 1 year. Also notes having occasional headaches, without any significant temporal pattern. (17 Oct 2019 19:25)    Additions-   phone used #[id number].         PAST MEDICAL & SURGICAL HISTORY:  Hypertension  No significant past surgical history      Home Medications:      84y    FAMILY HISTORY:  No pertinent family history in first degree relatives      Marital Status:  (   )    (   ) Single    (   )    (  )   Lives with: (  ) alone  (X  ) children   (  ) spouse   (  ) parents  (  ) other  Recent Travel: No recent travel  Occupation:    Substance Use (street drugs): ( x ) never used  (  ) other:  Tobacco Usage:  ( x  ) never smoked   (   ) former smoker   (   ) current smoker  (     ) pack year  Alcohol Usage: None       MEDICATIONS  (STANDING):  enoxaparin Injectable 40 milliGRAM(s) SubCutaneous every 24 hours    MEDICATIONS  (PRN):  acetaminophen   Tablet .. 650 milliGRAM(s) Oral every 6 hours PRN Moderate Pain (4 - 6)    REVIEW OF SYSTEMS:  CONSTITUTIONAL: No fever, weight loss, or fatigue  EYES: No eye pain, visual disturbances, or discharge  ENMT:  No difficulty hearing, tinnitus, vertigo; No sinus or throat pain  NECK: No pain or stiffness  BREASTS: No pain, masses, or nipple discharge  RESPIRATORY: No cough, wheezing, chills or hemoptysis; No shortness of breath  CARDIOVASCULAR: No chest pain, palpitations, dizziness, or leg swelling  GASTROINTESTINAL: AS ABOVE  GENITOURINARY: No dysuria, frequency, hematuria, or incontinence  NEUROLOGICAL: No headaches, memory loss, loss of strength, numbness, or tremors  SKIN: No itching, burning, rashes, or lesions   LYMPH NODES: No enlarged glands  ENDOCRINE: No heat or cold intolerance; No hair loss  MUSCULOSKELETAL: No joint pain or swelling; No muscle, back, or extremity pain  PSYCHIATRIC: No depression, anxiety, mood swings, or difficulty sleeping    Vital Signs Last 24 Hrs  T(C): 36.4 (18 Oct 2019 09:10), Max: 36.9 (18 Oct 2019 06:13)  T(F): 97.6 (18 Oct 2019 09:10), Max: 98.5 (18 Oct 2019 06:13)  HR: 64 (18 Oct 2019 16:31) (54 - 75)  BP: 172/121 (18 Oct 2019 16:31) (109/56 - 182/79)  BP(mean): --  RR: 18 (18 Oct 2019 16:11) (18 - 18)  SpO2: 97% (18 Oct 2019 16:11) (96% - 98%)    PHYSICAL EXAM:  GENERAL: NAD, well-groomed, well-developed  NECK: Supple, No JVD,   NERVOUS SYSTEM:  Alert & Oriented X3  CHEST/LUNG:  No rales, rhonchi, wheezing, or rubs  HEART: Regular rate and rhythm; +murmur  ABDOMEN: Soft, +TTP on L lower quadrant  EXTREMITIES:  No clubbing, cyanosis, or edema      Consultant(s) Notes Reviewed:  [x ] YES  [ ] NO  Care Discussed with Consultants/Other Providers [ x] YES  [ ] NO    LABS:                        11.8   4.28  )-----------( 164      ( 18 Oct 2019 06:29 )             36.5     10-18    141  |  111<H>  |  21  ----------------------------<  97  4.5   |  19<L>  |  1.03    Ca    8.4      18 Oct 2019 06:29  Phos  3.3     10-18  Mg     2.0     10-18    TPro  6.6  /  Alb  3.6  /  TBili  0.4  /  DBili  x   /  AST  13  /  ALT  6<L>  /  AlkPhos  98  10-18    PT/INR - ( 17 Oct 2019 19:09 )   PT: 14.2 sec;   INR: 1.25          PTT - ( 17 Oct 2019 19:09 )  PTT:37.7 sec    CAPILLARY BLOOD GLUCOSE

## 2019-10-21 NOTE — BRIEF OPERATIVE NOTE - COMMENTS
heparin reversed with protamine - heparin reversed with protamine  - contrast 180 cc  - fluoro time 17 min

## 2019-10-21 NOTE — BRIEF OPERATIVE NOTE - NSICDXBRIEFPROCEDURE_GEN_ALL_CORE_FT
PROCEDURES:  Vascular surgery procedure 21-Oct-2019 20:43:45 EVAR with visceral debranching (R ELSY to bilateral renal and SMA bypass) Jose Gibson PROCEDURES:  Vascular surgery procedure 21-Oct-2019 20:43:45 TEVAR with visceral debranching (R ELSY to bilateral renal and SMA bypass) Jose Gibson

## 2019-10-21 NOTE — BRIEF OPERATIVE NOTE - NSICDXBRIEFPOSTOP_GEN_ALL_CORE_FT
POST-OP DIAGNOSIS:  Abdominal aortic aneurysm 21-Oct-2019 20:45:02  Jose Gibson POST-OP DIAGNOSIS:  Thoracoabdominal aortic aneurysm 21-Oct-2019 21:21:03  Jose Gibson

## 2019-10-21 NOTE — CONSULT NOTE ADULT - ATTENDING COMMENTS
84 y woman with thoracoabdominal aneurysm encroaching renal arteries.  Presently with preserved GFR  Reviewed case with Dr. Tavarez prior- will be needing bilat renal bypass grafts-  Concern for CARSON- will need to keep well hydrated, preserve BP, limit ischemic time  LM with pt's granddaughter now to review renal risks
This patient was evaluated with the resident at bedside, management decisions were made, I agree with the A/P.  Ms. Terrell is an 89 y/o female with a thoraco-abdominal aneurysm s/p TEVAR and splenic infarc s/p resection.  EBL 3L, IVF given 5.5L, cell saver 1261 mls, PRBC 7, plts 1, albumin 5bottles, ancef, she was intubated and remains on the ventilator.  A/P  An 89 y/o female with:  -thoraco-abdominal aneurysm s/p TEVAR  -splenic infarc s/p resection  -post procedural respiratory failure, on ventilator  >propofol for sedation  >fentanyl gtt for pain control  >ventilator setting 350/14/5 and titrate the FiO2  >ABG and CXR  >add PPI  >leave rucker catheter in place for monitoring  >CVP and keep 8 to 10  >c/w prophylaxic antibiotics  >Will need vaccines prior to D/C (ID)  >check CBC, lytes, fibrinogen,   >RISS  >avoid pressors if possible  This patient is critically ill and need aggressive management

## 2019-10-21 NOTE — BRIEF OPERATIVE NOTE - NSICDXBRIEFPREOP_GEN_ALL_CORE_FT
PRE-OP DIAGNOSIS:  Abdominal aortic aneurysm 21-Oct-2019 20:44:51  Jose Gibson PRE-OP DIAGNOSIS:  Thoracoabdominal aortic aneurysm 21-Oct-2019 21:20:52  Jose iGbson

## 2019-10-21 NOTE — BRIEF OPERATIVE NOTE - OPERATION/FINDINGS
Preop Dx: Thoracoabdominal Aneurysm  Postop Dx: Same as above, intraoperative bleeding   Procedure: Splenectomy, control of intraoperative bleeding  Findings: Was called to OR for bleeding from spleen in hemodynamically unstable patient. Upon arrival, bleeding was controlled and splenectomy performed. Abdomen inspected, small area of oozing from caudate lobe of liver controlled with cautery and surgicel. Short gastrics oversewed by vascular surgery team. 19Fr garfield drain left by tail of pancreas. Please see Dr. Wyatt' operative dictation for further information. Please see vascular surgery operative note for further information about the operation.

## 2019-10-21 NOTE — PROGRESS NOTE ADULT - SUBJECTIVE AND OBJECTIVE BOX
Patient is a 84y old  Female who presents with a chief complaint of Thoracoabdominal Aneurysm (20 Oct 2019 12:24)    pt has no complaints  feels comfortable  no pain  no sob/chest tightness  ros otherwise negative    Review of Systems: 12 point review of systems otherwise negative            Vital Signs Last 24 Hrs  T(C): 36.4 (20 Oct 2019 12:27), Max: 37.2 (20 Oct 2019 06:12)  T(F): 97.6 (20 Oct 2019 12:27), Max: 98.9 (20 Oct 2019 06:12)  HR: 76 (20 Oct 2019 15:11) (63 - 84)  BP: 106/71 (20 Oct 2019 15:11) (106/71 - 228/99)  BP(mean): --  RR: 16 (20 Oct 2019 15:11) (16 - 17)  SpO2: 98% (20 Oct 2019 15:11) (96% - 99%)  CAPILLARY BLOOD GLUCOSE          10-19 @ 07:01  -  10-20 @ 07:00  --------------------------------------------------------  IN: 300 mL / OUT: 550 mL / NET: -250 mL    10-20 @ 07:01  -  10-20 @ 16:55  --------------------------------------------------------  IN: 360 mL / OUT: 750 mL / NET: -390 mL        Physical Exam:  (this morning)  Daily     Daily   General:  well-appearing in NAD, smiling  HEENT: MMM  CV:  RRR, no JVD  Abdomen: soft NT ND  Extremities:  no edema B/L LE  Skin:  WWP  Neuro:  AAOx2, forgetful    LABS:                        11.9   3.94  )-----------( 175      ( 20 Oct 2019 07:55 )             37.4     10-20    141  |  111<H>  |  25<H>  ----------------------------<  102<H>  4.1   |  20<L>  |  1.03    Ca    8.8      20 Oct 2019 07:55  Phos  3.8     10-20  Mg     1.9     10-20        Urinalysis Basic - ( 18 Oct 2019 21:35 )    Color: Yellow / Appearance: SL Cloudy / S.020 / pH: x  Gluc: x / Ketone: NEGATIVE  / Bili: Negative / Urobili: 0.2 E.U./dL   Blood: x / Protein: NEGATIVE mg/dL / Nitrite: NEGATIVE   Leuk Esterase: Small / RBC: < 5 /HPF / WBC Many /HPF   Sq Epi: x / Non Sq Epi: 0-5 /HPF / Bacteria: Present /HPF          RADIOLOGY & ADDITIONAL TESTS:    ---------------------------------------------------------------------------  I personally reviewed: [  ]EKG   [  ]CXR    [  ] CT    [  ]Other  ---------------------------------------------------------------------------  PLEASE CHECK WHEN PRESENT:     [  ]Heart Failure     [  ] Acute     [  ] Acute on Chronic     [  ] Chronic  -------------------------------------------------------------------     [  ]Diastolic [HFpEF]     [  ]Systolic [HFrEF]     [  ]Combined [HFpEF & HFrEF]     [  ]Other:  -------------------------------------------------------------------  [  ]CARSON     [  ]ATN     [  ]Reneal Medullary Necrosis     [  ]Renal Cortical Necrosis     [  ]Other Pathological Lesions:    [  ]CKD 1  [  ]CKD 2  [  ]CKD 3  [  ]CKD 4  [  ]CKD 5  [  ]Other  -------------------------------------------------------------------  [  ]Other/Unspecified:    --------------------------------------------------------------------    Abdominal Nutritional Status  [  ]Malnutrition: See Nutrition Note  [  ]Cachexia  [  ]Other:   [  ]Supplement Ordered:  [  ]Morbid Obesity (BMI >=40]

## 2019-10-21 NOTE — PROGRESS NOTE ADULT - PROBLEM SELECTOR PLAN 4
self-resolved; suggest ENT consult if epistaxis returns

## 2019-10-21 NOTE — PROGRESS NOTE ADULT - SUBJECTIVE AND OBJECTIVE BOX
24hr Events:  O/N: OWEN, VSS, NPO/IVF for OR   10/20 - HTN rteated with hydralaazine IV and increased dosage of Amlodiopine. Preoped for tomorrow. Bloodbank order for 4rbcs and blood products on hold.       Assessment/Plan:  84F with HTN, admitted for treatment of a thoracoabdominal aortic aneurysm (Winn IV)    - Pain/Nausea control PRN  - Tight BP control - Amlodipine and PRN IV medications  - NPO/IVF for OR tomorrow  - Ceftriaxone - for UTI  - Lovenox SubQ 24hr Events:  O/N: OWEN, VSS, NPO/IVF for OR   10/20 - HTN rteated with hydralaazine IV and increased dosage of Amlodiopine. Preoped for tomorrow. Bloodbank order for 4rbcs and blood products on hold.     cefTRIAXone   IVPB 1000  cefTRIAXone   IVPB   amLODIPine   Tablet 5  cefTRIAXone   IVPB 1000  cefTRIAXone   IVPB   enoxaparin Injectable 40        Vital Signs Last 24 Hrs  T(C): 37.1 (21 Oct 2019 05:38), Max: 37.1 (21 Oct 2019 05:38)  T(F): 98.8 (21 Oct 2019 05:38), Max: 98.8 (21 Oct 2019 05:38)  HR: 62 (21 Oct 2019 05:54) (62 - 84)  BP: 141/70 (21 Oct 2019 05:54) (106/71 - 228/99)  BP(mean): --  RR: 16 (21 Oct 2019 05:54) (14 - 16)  SpO2: 96% (21 Oct 2019 05:54) (96% - 100%)  I&O's Summary    20 Oct 2019 07:01  -  21 Oct 2019 07:00  --------------------------------------------------------  IN: 360 mL / OUT: 950 mL / NET: -590 mL        Physical Exam:  General: NAD, resting comfortably in bed  Pulmonary: Nonlabored breathing, no respiratory distress  Abdomen:  Soft, non-tender, no distended, no suprapubic tenderness. No palpable masses in the abdomen    LABS:                        11.9   3.94  )-----------( 175      ( 20 Oct 2019 07:55 )             37.4     10-20    141  |  111<H>  |  25<H>  ----------------------------<  102<H>  4.1   |  20<L>  |  1.03    Ca    8.8      20 Oct 2019 07:55  Phos  3.8     10-20  Mg     1.9     10-20      PLEASE CHECK WHEN PRESENT:     [  ]Heart Failure     [  ] Acute     [  ] Acute on Chronic     [  ] Chronic  -------------------------------------------------------------------     [  ]Diastolic [HFpEF]     [  ]Systolic [HFrEF]     [  ]Combined [HFpEF & HFrEF]     [  ]Other:  -------------------------------------------------------------------  [ ] Respiratory failure  [ ] Acute cor pulmonale  [ ] Asthma/COPD Exacerbation  [ ] Pleural effusion  [ ] Aspiration pneumonia  -------------------------------------------------------------------  [  ]CARSON     [  ]ATN     [  ]Reneal Medullary Necrosis     [  ]Renal Cortical Necrosis     [  ]Other Pathological Lesions:    [  ]CKD 1  [  ]CKD 2  [  ]CKD 3  [  ]CKD 4  [  ]CKD 5  [  ]Other  -------------------------------------------------------------------  [  ]Diabetes  [  ] Diabetic PVD Ulcer  [  ] Neuropathic ulcer to DM  [  ] Diabetes with Nephropathy  [  ] Osteomyelitis due to diabetes  --------------------------------------------------------------------  [  ]Malnutrition: See Nutrition Note  [  ]Cachexia  [  ]Other:   [  ]Supplement Ordered:  [  ]Morbid Obesity (BMI >=40]  ---------------------------------------------------------------------  [ ] Sepsis/severe sepsis/septic shock  [ ] UTI  [ ] Pneumonia  -----------------------------------------------------------------------  [ ] Acidosis/alkalosis  [ ] Fluid overload  [ ] Hypokalemia  [ ] Hyperkalemia  [ ] Hypomagnesemia  [ ] Hypophosphatemia  [ ] Hyperphosphatemia  ------------------------------------------------------------------------  [ ] Acute blood loss anemia  [ ] Post op blood loss anemia  [ ] Iron deficiency anemia  [ ] Anemia due to chronic disease  [ ] Hypercoagulable state  ----------------------------------------------------------------------  [ ] Cerebral infarction  [ ] Transient ischemia attack  [ ] Encephalopathy    Assessment/Plan:  84F with HTN, admitted for treatment of a thoracoabdominal aortic aneurysm (Winn IV)    - Pain/Nausea control PRN  - Tight BP control - Amlodipine and PRN IV medications  - NPO/IVF for OR today  - Ceftriaxone - for UTI  - Lovenox SubQ

## 2019-10-21 NOTE — PROGRESS NOTE ADULT - SUBJECTIVE AND OBJECTIVE BOX
INTERVAL HISTORY:  NL Nuc, for surgery  	  MEDICATIONS:  amLODIPine   Tablet 5 milliGRAM(s) Oral daily    cefTRIAXone   IVPB 1000 milliGRAM(s) IV Intermittent every 24 hours  cefTRIAXone   IVPB          acetaminophen   Tablet .. 650 milliGRAM(s) Oral every 6 hours PRN        enoxaparin Injectable 40 milliGRAM(s) SubCutaneous every 24 hours  influenza   Vaccine 0.5 milliLiter(s) IntraMuscular once  sodium chloride 0.9%. 1000 milliLiter(s) IV Continuous <Continuous>        PHYSICAL EXAM:  T(C): 37.1 (10-21-19 @ 05:38), Max: 37.1 (10-21-19 @ 05:38)  HR: 62 (10-21-19 @ 05:54) (62 - 84)  BP: 141/70 (10-21-19 @ 05:54) (106/71 - 228/99)  RR: 16 (10-21-19 @ 05:54) (14 - 16)  SpO2: 96% (10-21-19 @ 05:54) (96% - 100%)  Wt(kg): --  I&O's Summary    20 Oct 2019 07:01  -  21 Oct 2019 07:00  --------------------------------------------------------  IN: 360 mL / OUT: 950 mL / NET: -590 mL          Appearance: Normal	  HEENT:   Normal oral mucosa, PERRL, EOMI	  Lymphatic: No lymphadenopathy  Cardiovascular: Normal S1 S2, No JVD, No murmurs, No edema  Respiratory: Lungs clear to auscultation	  Psychiatry: A & O x 3, Mood & affect appropriate  Gastrointestinal:  PULSATILE AORTA  Skin: No rashes, No ecchymoses, No cyanosis  Neurologic: Non-focal  Extremities: Normal range of motion, No clubbing, cyanosis or edema  Vascular: Peripheral pulses palpable 2+ bilaterally    TELEMETRY: 	    ECG:  	  RADIOLOGY:   DIAGNOSTIC TESTING:  [ ] Echocardiogram:  [ ]  Catheterization:  [ ] Stress Test:    OTHER: 	    LABS:	 	    CARDIAC MARKERS:                                  12.3   3.88  )-----------( 175      ( 21 Oct 2019 07:32 )             37.9     10-21    143  |  110<H>  |  18  ----------------------------<  109<H>  3.9   |  22  |  0.87    Ca    8.9      21 Oct 2019 07:32  Phos  4.2     10-21  Mg     2.0     10-21      proBNP:   Lipid Profile:   HgA1c:   TSH:     ASSESSMENT/PLAN:

## 2019-10-21 NOTE — BRIEF OPERATIVE NOTE - OPERATION/FINDINGS
Midline laparotomy for R ELSY to SMA and bilateral renal artery bypass graft using Dacron *** and 8 mm Hemashield gold. Angiogram shows patent R ELSY anastomosis, patent conduit, and patent distal anastomoses.     Endovascular aneurysm repair using [place] TAG 31x150 mm distal stent graft ([id number]), 34x200 mm proximal stent graft ([id number]), and extended distally with [place] Excluder 32x40 mm cuff ([id number]), with adequate exclusion of aneurysm. R CFA 5 Fr percutaneous access. L CFA 22 Fr access via cutdown. Midline laparotomy for visceral debranching using Gelweave 12x8x8 mm trifurcated graft (R ELSY to L renal, SMA), and additional 8 mm Hemashield gold jump graft limb to R renal artery. Angiogram shows patent R ELSY anastomosis, patent conduit, and patent visceral anastomoses.     Endovascular aneurysm repair using [place] TAG 31x150 mm distal stent graft ([id number]), 34x200 mm proximal stent graft ([id number]), and extended distally with [place] Excluder 32x40 mm cuff ([id number]), with adequate exclusion of aneurysm. R CFA 5 Fr percutaneous access. L CFA 22 Fr access via cutdown.    After EVAR, large venous bleeding noted from spleen. General Surgery consulted intra-operatively for splenectomy. Hemostasis achieved. Midline laparotomy for visceral debranching using Gelweave 12x8x8 mm trifurcated graft (R ELSY to L renal, SMA), and additional 8 mm Hemashield gold jump graft limb to R renal artery. Angiogram shows patent R ELSY anastomosis, patent conduit, and patent visceral anastomoses.     Endovascular aneurysm repair using [place] TAG 31x150 mm distal stent graft ([id number]), 34x200 mm proximal stent graft ([id number]), and extended distally with [place] Excluder 32x40 mm cuff ([id number]), with adequate exclusion of aneurysm. R CFA 5 Fr percutaneous access. L CFA 22 Fr access via cutdown.    After TEVAR, large venous bleeding noted from spleen. General Surgery consulted intra-operatively for splenectomy. Hemostasis achieved.

## 2019-10-22 NOTE — PROGRESS NOTE ADULT - SUBJECTIVE AND OBJECTIVE BOX
Patient was seen and evaluated on dialysis.   HR: 116 (10-22-19 @ 22:00)  BP: 104/69 (10-22-19 @ 22:00)    10-22    139  |  109<H>  |  21  ----------------------------<  140<H>  5.4<H>   |  18<L>  |  2.30<H>    Ca    8.2<L>      22 Oct 2019 19:19  Phos  4.9     10-22  Mg     2.1     10-22    TPro  4.9<L>  /  Alb  2.3<L>  /  TBili  0.2  /  DBili  x   /  AST  271<H>  /  ALT  78<H>  /  AlkPhos  74  10-22    Blood Gas Venous - Potassium: 4.9 mmol/L (10.22.19 @ 19:54)        Continue dialysis:   Dialyzer:   160    QB: 250  K bath: 2k  Goal UF:     0  L   over       120        min  Patient tolerated 1 hr of HD on levo at 17 which was increased to 25 after became hypotensive in systolic 90s, and tachycardic in 120s, along with falling o2sat.    Post HD venous k 4.9

## 2019-10-22 NOTE — PROVIDER CONTACT NOTE (CHANGE IN STATUS NOTIFICATION) - SITUATION
zero u/o at 11am. 500cc NS bolus given at 11am as ordered. rucker catheter checked & flushed as ordered by ICU at bedside. rucker did not have obstruction, flushed well.

## 2019-10-22 NOTE — DIETITIAN INITIAL EVALUATION ADULT. - ENERGY NEEDS
Ht (10/21): 162.6cm, Wt (10/21): 57kg, IBW: 54.5kg+/-10%, %IBW: 104%, BMI: 21.6   IBW used to calculate energy needs as pt vented. Needs adjusted for vent.

## 2019-10-22 NOTE — PROGRESS NOTE ADULT - ASSESSMENT
84 y woman now s/p repair of thoracoabdominal aneurysm  on 10/21  no oligoanuric with hyperkalemia  will arrange for HD today  Needs immediate attention to new jump up in potassium-  Insulin/glucose, calcium  reviewed with surgical ICU team now

## 2019-10-22 NOTE — PROGRESS NOTE ADULT - SUBJECTIVE AND OBJECTIVE BOX
Patient seen and examined at bedside.   Reviewed with surgery lst PM  and again this AM and afternoon-  Pt seen and examined several times  developed oligoanuria this AM  doppler then showed no flow through renal artery bypass grafts        T(C): , Max: 36.7 (10-22-19 @ 13:52)  T(F): , Max: 98 (10-22-19 @ 13:52)  HR: 102 (10-22-19 @ 15:21)  BP: 126/75 (10-22-19 @ 15:00)  BP(mean): 89 (10-22-19 @ 15:00)  RR: 12 (10-22-19 @ 15:21)  SpO2: 91% (10-22-19 @ 15:21)  Wt(kg): --    10-21 @ 07:01  -  10-22 @ 07:00  --------------------------------------------------------  IN:    clevidipine Infusion: 30 mL    fentaNYL Infusion.: 35.3 mL    IV PiggyBack: 600 mL    Lactated Ringers IV Bolus: 500 mL    lactated ringers.: 675 mL    propofol Infusion: 25 mL  Total IN: 1865.3 mL    OUT:    Bulb: 460 mL    Indwelling Catheter - Urethral: 208 mL    Nasoenteral Tube: 100 mL    Voided: 720 mL  Total OUT: 1488 mL    Total NET: 377.3 mL      10-22 @ 07:01  -  10-22 @ 15:53  --------------------------------------------------------  IN:    clevidipine Infusion: 40 mL    fentaNYL Infusion.: 51 mL    IV PiggyBack: 286 mL    lactated ringers.: 75 mL    propofol Infusion: 24 mL    Sodium Chloride 0.9% IV Bolus: 1500 mL  Total IN: 1976 mL    OUT:    Bulb: 85 mL    Indwelling Catheter - Urethral: 10 mL    Nasoenteral Tube: 100 mL  Total OUT: 195 mL    Total NET: 1781 mL            ceFAZolin   IVPB 1000 every 12 hours  chlorhexidine 0.12% Liquid 15 every 12 hours  clevidipine Infusion 4 <Continuous>  dextrose 5%. 1000 <Continuous>  dextrose 50% Injectable 12.5 once  dextrose 50% Injectable 25 once  dextrose 50% Injectable 25 once  fentaNYL   Infusion. 0.5 <Continuous>  influenza   Vaccine 0.5 once  insulin lispro (HumaLOG) corrective regimen sliding scale  Before meals and at bedtime  insulin lispro Injectable (HumaLOG) 10 once  pantoprazole  Injectable 40 daily  propofol Infusion 14.62 <Continuous>    Allergies    No Known Allergies    Intolerances      PHYSICAL EXAM:  Constitutional:  Intubated and sedated  Respiratory: reduced BS bases  Cardiovascular: S1, S2.  Regular rate and rhythm.    Gastrointestinal: soft, abd  incision dressing clean  Vasc/Extremities:  tr lower extremity edema.    Skin: Warm. Dry.          LABS:                        14.6   9.43  )-----------( 96       ( 22 Oct 2019 14:50 )             45.2     10-22    140  |  110<H>  |  23  ----------------------------<  142<H>  6.7<HH>   |  19<L>  |  2.29<H>    Ca    7.7<L>      22 Oct 2019 14:50  Phos  3.8     10-22  Mg     2.1     10-22    TPro  4.5<L>  /  Alb  2.7<L>  /  TBili  0.3  /  DBili  x   /  AST  157<H>  /  ALT  49<H>  /  AlkPhos  56  10-22      PT/INR - ( 22 Oct 2019 05:19 )   PT: 14.8 sec;   INR: 1.30          PTT - ( 22 Oct 2019 05:19 )  PTT:31.8 sec  Urinalysis Basic - ( 22 Oct 2019 14:14 )    Color: x / Appearance: x / SG: x / pH: x  Gluc: x / Ketone: x  / Bili: x / Urobili: x   Blood: x / Protein: x / Nitrite: x   Leuk Esterase: x / RBC: < 5 /HPF / WBC < 5 /HPF   Sq Epi: x / Non Sq Epi: 0-5 /HPF / Bacteria: Present /HPF      Osmolality, Random Urine: 313 mosmol/kg (10-22 @ 14:11)  Potassium, Random Urine: 12 mmol/L (10-22 @ 14:11)  Sodium, Random Urine: 132 mmol/L (10-22 @ 14:11)        RADIOLOGY & ADDITIONAL STUDIES:

## 2019-10-22 NOTE — DIETITIAN INITIAL EVALUATION ADULT. - OTHER INFO
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 Pt intubated, vent mode: AC/CMV. Propofol running at 3mL/hr (provides 79kcal from lipid), +Cleviprex infusion. Pt NPO, NGT to LCS. No family at bedside to obtain information regarding diet/wt hx. GI: no vomiting, unable to assess for nausea at this time, last BM 10/19, abdomen soft/nontender, Skin: Franco score 13, Pain: No apparent pain/distress at this time. Recommend starting EN when medically feasible. If unable to start EN 2/2 clinical status, consider starting TPN. Please see below for full nutritional recommendations- d/w team. RD to monitor and f/u per protocol. 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 Pt intubated, vent mode: AC/CMV. Propofol running at 3mL/hr (provides 79kcal from lipid), +Cleviprex infusion. Pt NPO, NGT to LCS (100mL output x24h). Per orders, pt on regular diet from 10/17-10/21 with 2 occasions of NPO after midnight. No family at bedside to obtain information regarding diet/wt hx. GI: no vomiting, unable to assess for nausea at this time, last BM 10/19, abdomen soft/nontender, Skin: Franco score 13, Pain: No apparent pain/distress at this time. Recommend starting EN when medically feasible. If unable to start EN 2/2 clinical status, consider starting TPN. Please see below for full nutritional recommendations- d/w team. RD to monitor and f/u per protocol.

## 2019-10-22 NOTE — PROGRESS NOTE ADULT - SUBJECTIVE AND OBJECTIVE BOX
Interval events over the course of the day:    10/22: UO decreasing, CVP3, given 1 L NS, out of sync with vent, TV increased to 400, metabolic acidosis. Pt anuric around 10am, rucker flushed and patent, UA sent without casts, renal duplex with minimal flow b/l. IVF and Heparin DC'd, pending IR Philip catheter, 2pm labs shows Hg/Wbc stable, worsening metabolic acidosis, lactate 4 from 3.8.     patient seen and examined at bedside   plan for catheter placement for RRT (intermitted vs. CVVH at bedside)   DANAE drain in place and serosanguinous   rucker in place with minimal output   remains on clevidipine gtt     ICU Vital Signs Last 24 Hrs  T(C): 36.7 (22 Oct 2019 13:52), Max: 36.7 (22 Oct 2019 13:52)  T(F): 98 (22 Oct 2019 13:52), Max: 98 (22 Oct 2019 13:52)  HR: 102 (22 Oct 2019 15:21) (54 - 102)  BP: 126/75 (22 Oct 2019 15:00) (116/66 - 227/111)  BP(mean): 89 (22 Oct 2019 15:00) (88 - 166)  ABP: 122/58 (22 Oct 2019 15:00) (104/48 - 196/84)  ABP(mean): 80 (22 Oct 2019 15:00) (70 - 130)  RR: 12 (22 Oct 2019 15:21) (12 - 18)  SpO2: 91% (22 Oct 2019 15:21) (91% - 100%)    I&O's Summary    21 Oct 2019 07:01  -  22 Oct 2019 07:00  --------------------------------------------------------  IN: 1865.3 mL / OUT: 1488 mL / NET: 377.3 mL    22 Oct 2019 07:01  -  22 Oct 2019 16:03  --------------------------------------------------------  IN: 1976 mL / OUT: 195 mL / NET: 1781 mL    Physical Exam:  General: Intubated and ventilated  Pulmonary: Nonlabored breathing  Cardiovascular: NSR  Abdominal: soft, NT/ND  Midline dressing stained with blood, no active bleeding  DANAE SS output, amount recorded below  Vasc: DP palpable bilaterally                           14.6   9.43  )-----------( 96       ( 22 Oct 2019 14:50 )             45.2     10-22    140  |  110<H>  |  23  ----------------------------<  142<H>  6.7<HH>   |  19<L>  |  2.29<H>    Ca    7.7<L>      22 Oct 2019 14:50  Phos  3.8     10-22  Mg     2.1     10-22    TPro  4.5<L>  /  Alb  2.7<L>  /  TBili  0.3  /  DBili  x   /  AST  157<H>  /  ALT  49<H>  /  AlkPhos  56  10-22      84F with HTN, admitted for treatment of thoracoabdominal aortic aneurysm, POD1 s/p ex-lap TEVAR with visceral debranching (R ELSY to bilateral renal and SMA bypass), OR course complicated by bleeding from spleen necessitating splenectomy.  - post op course complicated by acute renal failure, patient will likely require RRT   - wean sedation as needed   - wean to extubate   - ISS   - wean clevidipine gtt as needed   - cefazolin x 3 doses perioperatively   - patient will require vaccines in two weeks given splenectomy   - care per SICU

## 2019-10-22 NOTE — PROGRESS NOTE ADULT - SUBJECTIVE AND OBJECTIVE BOX
Subjective  Pt seen and examined at bedside with chief residents, intubated and ventilated.     MEDICATIONS  (STANDING):  acetaminophen  IVPB .. 1000 milliGRAM(s) IV Intermittent once  ceFAZolin   IVPB 1000 milliGRAM(s) IV Intermittent every 8 hours  chlorhexidine 0.12% Liquid 15 milliLiter(s) Oral Mucosa every 12 hours  clevidipine Infusion 4 mG/Hr (8 mL/Hr) IV Continuous <Continuous>  dextrose 5%. 1000 milliLiter(s) (50 mL/Hr) IV Continuous <Continuous>  dextrose 50% Injectable 12.5 Gram(s) IV Push once  dextrose 50% Injectable 25 Gram(s) IV Push once  dextrose 50% Injectable 25 Gram(s) IV Push once  fentaNYL   Infusion. 0.5 MICROgram(s)/kG/Hr (2.85 mL/Hr) IV Continuous <Continuous>  influenza   Vaccine 0.5 milliLiter(s) IntraMuscular once  insulin lispro (HumaLOG) corrective regimen sliding scale   SubCutaneous Before meals and at bedtime  lactated ringers. 1000 milliLiter(s) (75 mL/Hr) IV Continuous <Continuous>  pantoprazole  Injectable 40 milliGRAM(s) IV Push daily  potassium chloride  10 mEq/100 mL IVPB 10 milliEquivalent(s) IV Intermittent every 1 hour  potassium chloride  20 mEq/100 mL IVPB 20 milliEquivalent(s) IV Intermittent every 2 hours  potassium phosphate IVPB 21 milliMole(s) IV Intermittent once  propofol Infusion 14.62 MICROgram(s)/kG/Min (5 mL/Hr) IV Continuous <Continuous>    MEDICATIONS  (PRN):  dextrose 40% Gel 15 Gram(s) Oral once PRN Blood Glucose LESS THAN 70 milliGRAM(s)/deciliter  glucagon  Injectable 1 milliGRAM(s) IntraMuscular once PRN Glucose LESS THAN 70 milligrams/deciliter      Butler:	  [ ] None	[ ] Daily Butler Order Placed	   Indication:	  [ ] Strict I and O's    [ ] Obstruction     [ ] Incontinence + Stage 3 or 4 Decubitus  Central Line:  [ ] None	   [ ]  Medication / TPN Administration     Drips:     ICU Vital Signs Last 24 Hrs  T(C): 36.6 (22 Oct 2019 05:42), Max: 37.1 (21 Oct 2019 08:40)  T(F): 97.9 (22 Oct 2019 05:42), Max: 98.8 (21 Oct 2019 08:40)  HR: 90 (22 Oct 2019 06:00) (54 - 96)  BP: 138/79 (22 Oct 2019 06:00) (116/66 - 227/111)  BP(mean): 97 (22 Oct 2019 06:00) (88 - 166)  ABP: 146/66 (22 Oct 2019 06:00) (104/48 - 196/84)  ABP(mean): 92 (22 Oct 2019 06:00) (70 - 130)  RR: 13 (22 Oct 2019 06:00) (13 - 18)  SpO2: 95% (22 Oct 2019 06:00) (95% - 100%)      Physical Exam:  General: Intubated and ventilated  Pulmonary: Nonlabored breathing, no respiratory distress, CTA-B  Cardiovascular: NSR, no murmurs  Abdominal: soft, NT/ND, +BS, no organomegaly. Midline dressing stained with blood, no active bleeding, DANAE SS output, amount recorded below.     Lines/tubes/drains:    Vent settings:  Mode: AC/ CMV (Assist Control/ Continuous Mandatory Ventilation), RR (machine): 12, TV (machine): 430, FiO2: 40, PEEP: 5, ITime: 1, MAP: 7.7, PIP: 16    I&O's Detail    21 Oct 2019 07:01  -  22 Oct 2019 07:00  --------------------------------------------------------  IN:    clevidipine Infusion: 26 mL    fentaNYL Infusion.: 29.3 mL    IV PiggyBack: 600 mL    Lactated Ringers IV Bolus: 500 mL    lactated ringers.: 600 mL    propofol Infusion: 22 mL  Total IN: 1777.3 mL    OUT:    Bulb: 460 mL    Indwelling Catheter - Urethral: 180 mL    Voided: 720 mL  Total OUT: 1360 mL    Total NET: 417.3 mL            LABS:                        14.0   6.12  )-----------( 112      ( 22 Oct 2019 05:19 )             42.2     10-22    140  |  107  |  19  ----------------------------<  147<H>  3.7   |  20<L>  |  1.63<H>    Ca    8.5      22 Oct 2019 05:19  Phos  2.2     10-22  Mg     2.0     10-22    TPro  4.5<L>  /  Alb  2.7<L>  /  TBili  0.3  /  DBili  x   /  AST  157<H>  /  ALT  49<H>  /  AlkPhos  56  10-22    PT/INR - ( 22 Oct 2019 05:19 )   PT: 14.8 sec;   INR: 1.30          PTT - ( 22 Oct 2019 05:19 )  PTT:31.8 sec    CAPILLARY BLOOD GLUCOSE      POCT Blood Glucose.: 131 mg/dL (22 Oct 2019 05:39)  POCT Blood Glucose.: 159 mg/dL (22 Oct 2019 00:07)  POCT Blood Glucose.: 226 mg/dL (21 Oct 2019 20:47)  POCT Blood Glucose.: 243 mg/dL (21 Oct 2019 19:55)  POCT Blood Glucose.: 220 mg/dL (21 Oct 2019 19:31)  POCT Blood Glucose.: 226 mg/dL (21 Oct 2019 17:43)  POCT Blood Glucose.: 248 mg/dL (21 Oct 2019 16:08)  POCT Blood Glucose.: 144 mg/dL (21 Oct 2019 14:34)    LIVER FUNCTIONS - ( 22 Oct 2019 05:19 )  Alb: 2.7 g/dL / Pro: 4.5 g/dL / ALK PHOS: 56 U/L / ALT: 49 U/L / AST: 157 U/L / GGT: x             Cultures:    RADIOLOGY & ADDITIONAL STUDIES:

## 2019-10-22 NOTE — PROGRESS NOTE ADULT - ASSESSMENT
84F with HTN, admitted for treatment of a thoracoabdominal aortic aneurysm (Winn IV), s/p ex lap TEVAR with visceral debranching (R ELSY to bilateral renal and SMA bypass), splenectomy for large venous bleeding).    Neuro: fentanyl/propofol for sedation  CV: Echo 55%, normal stress test; LR @75, goal -160; clevidipine gtt. F/U repeat am labs  Pulm: intubated, vent 40/400/12/5, Tidal volume increased, f/u repeat ABG and VBG  FENGI: NPO, NGT, PPI  ID: ancef x3 doses  : Butler in place, closely monitor UO   Endo: ISS  PPx: SCDs, hold SQH  Lines: RIJ introducer w/ 2 ports (10/21 - ), L a-line (10/21 - )  Wounds/drains: DANAE LUQ (10/21 - ), L groin cutdown, R 5Fr sheath (removed), midline incision  PT/OT: not ordered

## 2019-10-22 NOTE — PROGRESS NOTE ADULT - ASSESSMENT
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 EBL 3L, IVF given 5.5L, cell saver 1261 mls, PRBC 7, plts 1, albumin 5bottles    Plan  Neuro: Fentanyl and propofol for sedation to RASS -1  CV: Echo 55%, LR at 75. BP goal 120-160  Pulm: Intubated and ventilated.   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 EBL 3L, IVF given 5.5L, cell saver 1261 mls, PRBC 7, plts 1, albumin 5bottles    Plan  Neuro: Fentanyl and propofol for sedation to RASS -1  CV: Echo 55%, LR at 75. BP goal 120-160  Pulm: Intubated and ventilated.   GI: NPO, NGT to LCS, PPI  ID: Ancef x 3 doses  Endo: ISS  PPX: SCD, hold SQH    PGY3 addendum: please also ensure strict intake and output, monitor DANAE drain output, and ensure patient receives post-splenectomy vaccinations for coverate of Strep pneumo (pneumovax 23), Haemophilus influenza type B, and N. meningititis

## 2019-10-22 NOTE — PROGRESS NOTE ADULT - SUBJECTIVE AND OBJECTIVE BOX
INTERVAL HISTORY:  s/p surgery, intubated and sedated in ICU  	  MEDICATIONS:  clevidipine Infusion 4 mG/Hr IV Continuous <Continuous>    ceFAZolin   IVPB 1000 milliGRAM(s) IV Intermittent every 12 hours      fentaNYL   Infusion. 0.5 MICROgram(s)/kG/Hr IV Continuous <Continuous>  propofol Infusion 14.62 MICROgram(s)/kG/Min IV Continuous <Continuous>    pantoprazole  Injectable 40 milliGRAM(s) IV Push daily    dextrose 40% Gel 15 Gram(s) Oral once PRN  dextrose 50% Injectable 12.5 Gram(s) IV Push once  dextrose 50% Injectable 25 Gram(s) IV Push once  dextrose 50% Injectable 25 Gram(s) IV Push once  glucagon  Injectable 1 milliGRAM(s) IntraMuscular once PRN  insulin lispro (HumaLOG) corrective regimen sliding scale   SubCutaneous Before meals and at bedtime    chlorhexidine 0.12% Liquid 15 milliLiter(s) Oral Mucosa every 12 hours  dextrose 5%. 1000 milliLiter(s) IV Continuous <Continuous>  influenza   Vaccine 0.5 milliLiter(s) IntraMuscular once  lactated ringers. 1000 milliLiter(s) IV Continuous <Continuous>  potassium phosphate IVPB 21 milliMole(s) IV Intermittent once  sodium chloride 0.9% Bolus 1000 milliLiter(s) IV Bolus once        PHYSICAL EXAM:  T(C): 36.6 (10-22-19 @ 05:42), Max: 37.1 (10-21-19 @ 08:40)  HR: 102 (10-22-19 @ 07:00) (54 - 102)  BP: 127/81 (10-22-19 @ 07:00) (116/66 - 227/111)  RR: 12 (10-22-19 @ 07:00) (12 - 18)  SpO2: 94% (10-22-19 @ 07:00) (94% - 100%)  Wt(kg): --  I&O's Summary    21 Oct 2019 07:01  -  22 Oct 2019 07:00  --------------------------------------------------------  IN: 1865.3 mL / OUT: 1488 mL / NET: 377.3 mL    22 Oct 2019 07:01  -  22 Oct 2019 08:10  --------------------------------------------------------  IN: 88 mL / OUT: 0 mL / NET: 88 mL      Height (cm): 162.56 (10-21 @ 10:11)  Weight (kg): 57 (10-21 @ 10:11)  BMI (kg/m2): 21.6 (10-21 @ 10:11)  BSA (m2): 1.61 (10-21 @ 10:11)    Appearance: Sedated, intubated  HEENT:   On Vent  Lymphatic: No lymphadenopathy  Cardiovascular: Normal S1 S2, No JVD, No murmurs, No edema  Respiratory: BS B/L  Gastrointestinal:  Soft, Non-tender, + BS	  Skin: No rashes, No ecchymoses, No cyanosis  Extremities:No clubbing, cyanosis or edema  Vascular: Peripheral pulses palpable 2+ bilaterally    TELEMETRY: 	  NSR  ECG:  	  RADIOLOGY:   DIAGNOSTIC TESTING:  [ ] Echocardiogram:  [ ]  Catheterization:  [ ] Stress Test:    OTHER: 	    LABS:	 	    CARDIAC MARKERS:                                  14.0   6.12  )-----------( 112      ( 22 Oct 2019 05:19 )             42.2     10-22    140  |  107  |  19  ----------------------------<  147<H>  3.7   |  20<L>  |  1.63<H>    Ca    8.5      22 Oct 2019 05:19  Phos  2.2     10-22  Mg     2.0     10-22    TPro  4.5<L>  /  Alb  2.7<L>  /  TBili  0.3  /  DBili  x   /  AST  157<H>  /  ALT  49<H>  /  AlkPhos  56  10-22    proBNP:   Lipid Profile:   HgA1c: Hemoglobin A1C, Whole Blood: 5.4 % (10-22 @ 05:19)    TSH:     ASSESSMENT/PLAN:

## 2019-10-22 NOTE — PROGRESS NOTE ADULT - SUBJECTIVE AND OBJECTIVE BOX
Patient is a 84y old  Female who presents with a chief complaint of Thoracoabdominal Aneurysm (22 Oct 2019 16:00)      INTERVAL HPI/OVERNIGHT EVENTS: Afebrile, on Clevedipine overnight for goal -160, decreased UO via rucker catheter, given total 1200cc IVF overnight. Lactate trending down 11.2 postop to 5.0 this am. Breathing out of sync with ventilator this am, significantly improved after increased Tidal volume to 400.     MEDICATIONS  (STANDING):  ceFAZolin   IVPB 1000 milliGRAM(s) IV Intermittent every 12 hours  chlorhexidine 0.12% Liquid 15 milliLiter(s) Oral Mucosa every 12 hours  clevidipine Infusion 4 mG/Hr (8 mL/Hr) IV Continuous <Continuous>  dextrose 5%. 1000 milliLiter(s) (50 mL/Hr) IV Continuous <Continuous>  dextrose 50% Injectable 12.5 Gram(s) IV Push once  dextrose 50% Injectable 25 Gram(s) IV Push once  dextrose 50% Injectable 25 Gram(s) IV Push once  fentaNYL   Infusion. 0.5 MICROgram(s)/kG/Hr (2.85 mL/Hr) IV Continuous <Continuous>  influenza   Vaccine 0.5 milliLiter(s) IntraMuscular once  insulin lispro (HumaLOG) corrective regimen sliding scale   SubCutaneous Before meals and at bedtime  norepinephrine Infusion 0.05 MICROgram(s)/kG/Min (5.344 mL/Hr) IV Continuous <Continuous>  pantoprazole  Injectable 40 milliGRAM(s) IV Push daily  propofol Infusion 14.62 MICROgram(s)/kG/Min (5 mL/Hr) IV Continuous <Continuous>    MEDICATIONS  (PRN):  dextrose 40% Gel 15 Gram(s) Oral once PRN Blood Glucose LESS THAN 70 milliGRAM(s)/deciliter  glucagon  Injectable 1 milliGRAM(s) IntraMuscular once PRN Glucose LESS THAN 70 milligrams/deciliter      PHYSICAL EXAM:    ICU Vital Signs Last 24 Hrs  T(C): 37.2 (22 Oct 2019 18:00), Max: 37.2 (22 Oct 2019 17:47)  T(F): 99 (22 Oct 2019 18:00), Max: 99 (22 Oct 2019 17:47)  HR: 108 (22 Oct 2019 18:00) (54 - 108)  BP: 116/66 (22 Oct 2019 17:45) (116/66 - 227/111)  BP(mean): 76 (22 Oct 2019 17:45) (76 - 166)  ABP: 122/57 (22 Oct 2019 18:00) (104/48 - 196/84)  ABP(mean): 70 (22 Oct 2019 17:45) (70 - 130)  RR: 12 (22 Oct 2019 18:00) (12 - 18)  SpO2: 93% (22 Oct 2019 18:00) (91% - 100%)    I&O's Summary    21 Oct 2019 07:01  -  22 Oct 2019 07:00  --------------------------------------------------------  IN: 1865.3 mL / OUT: 1488 mL / NET: 377.3 mL    22 Oct 2019 07:01  -  22 Oct 2019 19:04  --------------------------------------------------------  IN: 1992 mL / OUT: 195 mL / NET: 1797 mL      Mode: AC/ CMV (Assist Control/ Continuous Mandatory Ventilation)  RR (machine): 12  TV (machine): 400  FiO2: 50  PEEP: 5  ITime: 1  MAP: 7.2  PIP: 16    GENERAL: sedated, intubated  HEAD:  Atraumatic, Normocephalic  EYES: EOMI, PERRLA, conjunctiva and sclera clear  NERVOUS SYSTEM:  AAO X3, Good concentration; Motor Strength 5/5 B/L upper and lower extremities; sensory intact  ENT: Moist mucous membranes  NECK: Supple, Double lumen R IJ catheter in place and functioning  CHEST/LUNG: B/L good air entry; No rales or rhonchi, on AC  HEART: S1S2 normal, no S3, Regular rate and rhythm; No murmurs, rubs, or gallops  ABDOMEN: Soft, moderate distention, unable to assess for tenderness due to sedation, Bowel sounds present. NGT in place to LIWS, draining bilious  EXTREMITIES:  2+ Peripheral DP/PT pulses b/l , WWP, no LE edema  SKIN: No rashes or lesions    LABS:                        14.6   9.43  )-----------( 96       ( 22 Oct 2019 14:50 )             45.2     10-22    140  |  110<H>  |  23  ----------------------------<  142<H>  6.7<HH>   |  19<L>  |  2.29<H>    Ca    7.7<L>      22 Oct 2019 14:50  Phos  3.8     10-22  Mg     2.1     10-22    TPro  4.5<L>  /  Alb  2.7<L>  /  TBili  0.3  /  DBili  x   /  AST  157<H>  /  ALT  49<H>  /  AlkPhos  56  10-22    PT/INR - ( 22 Oct 2019 05:19 )   PT: 14.8 sec;   INR: 1.30          PTT - ( 22 Oct 2019 05:19 )  PTT:31.8 sec  Urinalysis Basic - ( 22 Oct 2019 14:14 )    Color: x / Appearance: x / SG: x / pH: x  Gluc: x / Ketone: x  / Bili: x / Urobili: x   Blood: x / Protein: x / Nitrite: x   Leuk Esterase: x / RBC: < 5 /HPF / WBC < 5 /HPF   Sq Epi: x / Non Sq Epi: 0-5 /HPF / Bacteria: Present /HPF      CAPILLARY BLOOD GLUCOSE      POCT Blood Glucose.: 135 mg/dL (22 Oct 2019 15:28)  POCT Blood Glucose.: 124 mg/dL (22 Oct 2019 11:44)  POCT Blood Glucose.: 131 mg/dL (22 Oct 2019 05:39)  POCT Blood Glucose.: 159 mg/dL (22 Oct 2019 00:07)  POCT Blood Glucose.: 226 mg/dL (21 Oct 2019 20:47)  POCT Blood Glucose.: 243 mg/dL (21 Oct 2019 19:55)  POCT Blood Glucose.: 220 mg/dL (21 Oct 2019 19:31)    ABG - ( 22 Oct 2019 14:49 )  pH, Arterial: 7.29  pH, Blood: x     /  pCO2: 40    /  pO2: 69    / HCO3: 19    / Base Excess: -7.2  /  SaO2: 93                  RADIOLOGY & ADDITIONAL TESTS:    Consultant(s) Notes Reviewed:  [x ] YES  [ ] NO    Care Discussed with Consultants/Other Providers [ x] YES  [ ] NO

## 2019-10-22 NOTE — PROVIDER CONTACT NOTE (CHANGE IN STATUS NOTIFICATION) - RECOMMENDATIONS
EKG, Calcium Gluconate, Insulin 10 units, Duoneb, Stat Dialysis.
UA ordered. tried to bladder scan patient as ordered- unable to bladder scan as patient has dressing over & sutures by pubis area- MD Jean made aware. Md orders ultrasound.
Cards c/s - to add PO BP meds

## 2019-10-22 NOTE — DIETITIAN INITIAL EVALUATION ADULT. - PERTINENT MEDS FT
LR@75mL/hr, Humalog, Kphos (IV), Cleviprex infusion, Fentanyl, Protonix, Propofol @3mL/hr (79 kcal from lipid)

## 2019-10-23 NOTE — PROGRESS NOTE ADULT - ASSESSMENT
88 yo F female with PMH of HTN admitted for Thoracoabdominal aneurysm, s/p TEVAR (10/21/2019).  Hospital course complicated by severe lactic acidosis, hyperkalemia and anuria, now s/p urgent exploratory laparatomy, thrombectomy of b/l renal arteries, R salpingoophorectomy, and colectomy w/ end colostomy.     Neuro: sedation with propofol and fentanyl  CV: pressor support with levophed, weaning vaso  Pulm: Vent at FiO2 100%, wean as tolerated. oral care with chlorhexidine. will f/u CXR for ET placement  GI/FEN: NPO. NG tube in place. GI ppx with IV PPI.  : Anuric now s/p b/l renal thrombectomy- planned for CVVHD with nephrology following  ID: IV abx vanco and zosyn. 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 will f/u post op labs to monitor response  PPx: DVT ppx SCD (chemical DVT ppx held)  Wound: colostomy care. monitor I&O strict.  Lines: R fem HD catheter in place. 86 yo F female with PMH of HTN admitted for Thoracoabdominal aneurysm, s/p TEVAR (10/21/2019).  Hospital course complicated by severe lactic acidosis, hyperkalemia and anuria, now s/p urgent exploratory laparatomy, thrombectomy of b/l renal arteries, R salpingoophorectomy, and colectomy w/ end colostomy.     Neuro: sedation with propofol and fentanyl intraop, no longer running  CV: pressor support with levophed, weaning vaso  Pulm: Vent at FiO2 100%, wean as tolerated. oral care with chlorhexidine. will f/u CXR for ET placement  GI/FEN: NPO. NG tube in place. GI ppx with IV PPI.  : Anuric now s/p b/l renal thrombectomy- planned for CVVHD with nephrology following  ID: IV abx vanco and zosyn. 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 will f/u post op labs to monitor response  PPx: DVT ppx SCD (chemical DVT ppx held)  Wound: colostomy care. monitor I&O strict.  Lines: R fem HD catheter in place.

## 2019-10-23 NOTE — PROVIDER CONTACT NOTE (CHANGE IN STATUS NOTIFICATION) - BACKGROUND
Post op SBP goal 120-160. Pt should be able to move legs on neuro checks. After pt SBP dropped during HD and levo started, new goal for BP titration is MAP>65 as per MD Dougherty and MD Menezes

## 2019-10-23 NOTE — BRIEF OPERATIVE NOTE - OPERATION/FINDINGS
Exploratory laparotomy incision re-opened yielding frankly gangrenous and foul-smelling sigmoid colon with no obvious perforation. Retroperitoneum explosed yielding graft with palpable pulse however thrombus in renal grafts-- thrombectomized per vascular with Jez. Graft closed. Attention turned to bowel and splenic flexure mobilized, omentum divided, Left colon mobilized. Transverse colon transected with MIKAEL. Low anterior resection with contour stapler. Right salpingooophorectomy. Mesocolon taken with combination of clamp and tie and ligasure. Hepatic flexure mobilized and right colon mobilized and inspected. Spy showed good perfusion of remaining colon. End colostomy brought through RUQ. Omentum sutured over retroperitoneum. Fascia closed with 1 looped PDS x 2. SubQ closed with vircyl. Skin stapled. Colostomy matured. Provena vac on midline. Rigid proctoscopy yielded pink and perfused Carrera's pouch at 10cm.

## 2019-10-23 NOTE — BRIEF OPERATIVE NOTE - IV INFUSIONS - BLOOD PRODUCTS
3U PRBC, 2 FFP, 2 Plat, 2 Cryo
RBC x3  FFP x2  Plts x2  Cryo x2
RBC x7  FFP x2  Plt x1  Cryo x1  Cell saver 1261 cc

## 2019-10-23 NOTE — PROGRESS NOTE ADULT - SUBJECTIVE AND OBJECTIVE BOX
Patient was again seen and evaluated on CVVHD  HR: 114 (10-23-19 @ 17:58)  BP: 110/59 (10-23-19 @ 16:00)  Wt(kg): --  10-23    149<H>  |  107  |  24<H>  ----------------------------<  43<LL>  5.5<H>   |  14<L>  |  2.89<H>    Ca    10.6<H>      23 Oct 2019 14:09  Phos  8.5     10-23  Mg     2.2     10-23    TPro  4.3<L>  /  Alb  2.2<L>  /  TBili  1.6<H>  /  DBili  x   /  AST  >7000<H>  /  ALT  1447<H>  /  AlkPhos  83  10-23    Continue with acute CVVHD therapy  will increase Qd and Qb as BP and HR stabilize  Needs correction of acidosis-   will closely monitor

## 2019-10-23 NOTE — PROGRESS NOTE ADULT - SUBJECTIVE AND OBJECTIVE BOX
Post Op Note    SUBJECTIVE:  Patient seen and examined at bedside upon arriving to SICU from operating room. 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     Currently, patient is intubated on vent and on pressors, with pressor requirement decreasing compared to intraop.    MEDICATIONS  (STANDING):  chlorhexidine 0.12% Liquid 15 milliLiter(s) Oral Mucosa two times a day  CRRT Treatment    <Continuous>  dextrose 5%. 1000 milliLiter(s) (50 mL/Hr) IV Continuous <Continuous>  dextrose 50% Injectable 12.5 Gram(s) IV Push once  dextrose 50% Injectable 25 Gram(s) IV Push once  dextrose 50% Injectable 25 Gram(s) IV Push once  fentaNYL   Infusion. 0.5 MICROgram(s)/kG/Hr (2.85 mL/Hr) IV Continuous <Continuous>  hydrocortisone sodium succinate Injectable 50 milliGRAM(s) IV Push every 6 hours  influenza   Vaccine 0.5 milliLiter(s) IntraMuscular once  insulin lispro (HumaLOG) corrective regimen sliding scale   SubCutaneous Before meals and at bedtime  norepinephrine Infusion 0.05 MICROgram(s)/kG/Min (2.672 mL/Hr) IV Continuous <Continuous>  pantoprazole  Injectable 40 milliGRAM(s) IV Push daily  piperacillin/tazobactam IVPB.. 2.25 Gram(s) IV Intermittent once  piperacillin/tazobactam IVPB.. 2.25 Gram(s) IV Intermittent every 6 hours  piperacillin/tazobactam IVPB..      propofol Infusion 14.62 MICROgram(s)/kG/Min (5 mL/Hr) IV Continuous <Continuous>  PureFlow Dialysate RFP-400 (K 2 / Ca 3) 5000 milliLiter(s) (2500 mL/Hr) CRRT <Continuous>  vancomycin  IVPB 1000 milliGRAM(s) IV Intermittent once    MEDICATIONS  (PRN):  dextrose 40% Gel 15 Gram(s) Oral once PRN Blood Glucose LESS THAN 70 milliGRAM(s)/deciliter  glucagon  Injectable 1 milliGRAM(s) IntraMuscular once PRN Glucose LESS THAN 70 milligrams/deciliter      Vital Signs Last 24 Hrs  T(C): 37.1 (23 Oct 2019 06:13), Max: 37.2 (22 Oct 2019 17:47)  T(F): 98.8 (23 Oct 2019 06:13), Max: 99 (22 Oct 2019 17:47)  HR: 124 (23 Oct 2019 08:00) (94 - 132)  BP: 91/64 (23 Oct 2019 08:00) (69/51 - 167/82)  BP(mean): 70 (23 Oct 2019 08:00) (58 - 111)  RR: 14 (23 Oct 2019 08:00) (12 - 22)  SpO2: 89% (23 Oct 2019 08:00) (89% - 97%)    I&O's Detail    22 Oct 2019 07:01  -  23 Oct 2019 07:00  --------------------------------------------------------  IN:    clevidipine Infusion: 46 mL    dextrose 10%: 40 mL    fentaNYL Infusion.: 123.6 mL    IV PiggyBack: 386 mL    lactated ringers.: 75 mL    norepinephrine Infusion: 67.5 mL    norepinephrine Infusion: 80 mL    Other: 500 mL    phenylephrine   Infusion: 153.9 mL    propofol Infusion: 63 mL    Sodium Chloride 0.9% IV Bolus: 1500 mL  Total IN: 3035 mL    OUT:    Bulb: 155 mL    Estimated Blood Loss: 200 mL    Indwelling Catheter - Urethral: 10 mL    Nasoenteral Tube: 400 mL    Other: 387 mL  Total OUT: 1152 mL    Total NET: 1883 mL      23 Oct 2019 07:01  -  23 Oct 2019 14:12  --------------------------------------------------------  IN:    fentaNYL Infusion.: 3.3 mL    norepinephrine Infusion: 70 mL    propofol Infusion: 4 mL    vasopressin Infusion: 2.4 mL  Total IN: 79.7 mL    OUT:    Other: 34 mL  Total OUT: 34 mL    Total NET: 45.7 mL      Physical Exam:    Gen: NAD  Neuro: sedated, not following commands currently  Resp: vent FiO2 100%  CV: NSR, on levophed  Abd: colostomy in place. midline wound with dressing in place. abd soft  Extr: WWP, palpable DP b/l      LABS:                        12.1   6.31  )-----------( 23       ( 23 Oct 2019 08:00 )             38.3     10-23    138  |  108  |  23  ----------------------------<  116<H>  5.5<H>   |  12<L>  |  2.88<H>    Ca    8.0<L>      23 Oct 2019 08:07  Phos  6.2     10-23  Mg     2.2     10-23    TPro  4.5<L>  /  Alb  1.9<L>  /  TBili  0.5  /  DBili  <0.2  /  AST  1026<H>  /  ALT  253<H>  /  AlkPhos  94  10-23    PT/INR - ( 23 Oct 2019 08:00 )   PT: 22.5 sec;   INR: 1.95          PTT - ( 23 Oct 2019 08:00 )  PTT:53.3 sec  Urinalysis Basic - ( 22 Oct 2019 14:14 )    Color: x / Appearance: x / SG: x / pH: x  Gluc: x / Ketone: x  / Bili: x / Urobili: x   Blood: x / Protein: x / Nitrite: x   Leuk Esterase: x / RBC: < 5 /HPF / WBC < 5 /HPF   Sq Epi: x / Non Sq Epi: 0-5 /HPF / Bacteria: Present /HPF        RADIOLOGY & ADDITIONAL STUDIES: Post Op Note    SUBJECTIVE:  Patient seen and examined at bedside upon arriving to SICU from operating room. 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     Currently, patient is intubated on vent and on pressors, with pressor requirement decreasing compared to intraop.    MEDICATIONS  (STANDING):  chlorhexidine 0.12% Liquid 15 milliLiter(s) Oral Mucosa two times a day  CRRT Treatment    <Continuous>  dextrose 5%. 1000 milliLiter(s) (50 mL/Hr) IV Continuous <Continuous>  dextrose 50% Injectable 12.5 Gram(s) IV Push once  dextrose 50% Injectable 25 Gram(s) IV Push once  dextrose 50% Injectable 25 Gram(s) IV Push once  fentaNYL   Infusion. 0.5 MICROgram(s)/kG/Hr (2.85 mL/Hr) IV Continuous <Continuous>  hydrocortisone sodium succinate Injectable 50 milliGRAM(s) IV Push every 6 hours  influenza   Vaccine 0.5 milliLiter(s) IntraMuscular once  insulin lispro (HumaLOG) corrective regimen sliding scale   SubCutaneous Before meals and at bedtime  norepinephrine Infusion 0.05 MICROgram(s)/kG/Min (2.672 mL/Hr) IV Continuous <Continuous>  pantoprazole  Injectable 40 milliGRAM(s) IV Push daily  piperacillin/tazobactam IVPB.. 2.25 Gram(s) IV Intermittent once  piperacillin/tazobactam IVPB.. 2.25 Gram(s) IV Intermittent every 6 hours  piperacillin/tazobactam IVPB..      propofol Infusion 14.62 MICROgram(s)/kG/Min (5 mL/Hr) IV Continuous <Continuous>  PureFlow Dialysate RFP-400 (K 2 / Ca 3) 5000 milliLiter(s) (2500 mL/Hr) CRRT <Continuous>  vancomycin  IVPB 1000 milliGRAM(s) IV Intermittent once    MEDICATIONS  (PRN):  dextrose 40% Gel 15 Gram(s) Oral once PRN Blood Glucose LESS THAN 70 milliGRAM(s)/deciliter  glucagon  Injectable 1 milliGRAM(s) IntraMuscular once PRN Glucose LESS THAN 70 milligrams/deciliter      Vital Signs Last 24 Hrs  T(C): 37.1 (23 Oct 2019 06:13), Max: 37.2 (22 Oct 2019 17:47)  T(F): 98.8 (23 Oct 2019 06:13), Max: 99 (22 Oct 2019 17:47)  HR: 124 (23 Oct 2019 08:00) (94 - 132)  BP: 91/64 (23 Oct 2019 08:00) (69/51 - 167/82)  BP(mean): 70 (23 Oct 2019 08:00) (58 - 111)  RR: 14 (23 Oct 2019 08:00) (12 - 22)  SpO2: 89% (23 Oct 2019 08:00) (89% - 97%)    I&O's Detail    22 Oct 2019 07:01  -  23 Oct 2019 07:00  --------------------------------------------------------  IN:    clevidipine Infusion: 46 mL    dextrose 10%: 40 mL    fentaNYL Infusion.: 123.6 mL    IV PiggyBack: 386 mL    lactated ringers.: 75 mL    norepinephrine Infusion: 67.5 mL    norepinephrine Infusion: 80 mL    Other: 500 mL    phenylephrine   Infusion: 153.9 mL    propofol Infusion: 63 mL    Sodium Chloride 0.9% IV Bolus: 1500 mL  Total IN: 3035 mL    OUT:    Bulb: 155 mL    Estimated Blood Loss: 200 mL    Indwelling Catheter - Urethral: 10 mL    Nasoenteral Tube: 400 mL    Other: 387 mL  Total OUT: 1152 mL    Total NET: 1883 mL      23 Oct 2019 07:01  -  23 Oct 2019 14:12  --------------------------------------------------------  IN:    fentaNYL Infusion.: 3.3 mL    norepinephrine Infusion: 70 mL    propofol Infusion: 4 mL    vasopressin Infusion: 2.4 mL  Total IN: 79.7 mL    OUT:    Other: 34 mL  Total OUT: 34 mL    Total NET: 45.7 mL      Physical Exam:    Gen: NAD  Neuro: not sedated, not following commands currently  Resp: vent FiO2 100%  CV: NSR, on levophed  Abd: colostomy in place, with scant bloody mucosa. midline wound with dressing in place. abd soft  Extr: WWP, palpable DP b/l      LABS:                        12.1   6.31  )-----------( 23       ( 23 Oct 2019 08:00 )             38.3     10-23    138  |  108  |  23  ----------------------------<  116<H>  5.5<H>   |  12<L>  |  2.88<H>    Ca    8.0<L>      23 Oct 2019 08:07  Phos  6.2     10-23  Mg     2.2     10-23    TPro  4.5<L>  /  Alb  1.9<L>  /  TBili  0.5  /  DBili  <0.2  /  AST  1026<H>  /  ALT  253<H>  /  AlkPhos  94  10-23    PT/INR - ( 23 Oct 2019 08:00 )   PT: 22.5 sec;   INR: 1.95          PTT - ( 23 Oct 2019 08:00 )  PTT:53.3 sec  Urinalysis Basic - ( 22 Oct 2019 14:14 )    Color: x / Appearance: x / SG: x / pH: x  Gluc: x / Ketone: x  / Bili: x / Urobili: x   Blood: x / Protein: x / Nitrite: x   Leuk Esterase: x / RBC: < 5 /HPF / WBC < 5 /HPF   Sq Epi: x / Non Sq Epi: 0-5 /HPF / Bacteria: Present /HPF        RADIOLOGY & ADDITIONAL STUDIES:

## 2019-10-23 NOTE — PROGRESS NOTE ADULT - SUBJECTIVE AND OBJECTIVE BOX
Patient is a 84y old  Female who presents with a chief complaint of Thoracoabdominal Aneurysm (23 Oct 2019 15:35)      INTERVAL HPI/OVERNIGHT EVENTS: Pt now s/p exploration of R ELSY-visceral bypass with thrombectomy of bilateral renal limbs, subtotal colectomy, end colostomy, oophorectomy and oversewing of pancreatic tail. Pt returned to SICU intubated, off sedation without response to verbal or painful stimuli, on levophed 5, off vasopressing, saturating 88-92% on AC FiO2 100, PEEP 8, RR 16, CXR with no evidence of acute lung infiltrates, pneumothorax or significant pleural effusion, minimal serosanguinous secretions.  CVVHD restarted immediately at bedside, echocardiogram pending at bedside with bubble st. No urine output via rucker catheter.    MEDICATIONS  (STANDING):  chlorhexidine 0.12% Liquid 15 milliLiter(s) Oral Mucosa two times a day  CRRT Treatment    <Continuous>  dextrose 10%. 1000 milliLiter(s) (40 mL/Hr) IV Continuous <Continuous>  dextrose 5%. 1000 milliLiter(s) (50 mL/Hr) IV Continuous <Continuous>  dextrose 50% Injectable 12.5 Gram(s) IV Push once  dextrose 50% Injectable 25 Gram(s) IV Push once  dextrose 50% Injectable 25 Gram(s) IV Push once  dextrose 50% Injectable 25 Gram(s) IV Push once  fentaNYL   Infusion. 0.5 MICROgram(s)/kG/Hr (2.85 mL/Hr) IV Continuous <Continuous>  hydrocortisone sodium succinate Injectable 50 milliGRAM(s) IV Push every 6 hours  influenza   Vaccine 0.5 milliLiter(s) IntraMuscular once  insulin lispro (HumaLOG) corrective regimen sliding scale   SubCutaneous Before meals and at bedtime  norepinephrine Infusion 0.05 MICROgram(s)/kG/Min (2.672 mL/Hr) IV Continuous <Continuous>  pantoprazole  Injectable 40 milliGRAM(s) IV Push daily  piperacillin/tazobactam IVPB.. 2.25 Gram(s) IV Intermittent every 6 hours  piperacillin/tazobactam IVPB..      propofol Infusion 14.62 MICROgram(s)/kG/Min (5 mL/Hr) IV Continuous <Continuous>  PureFlow Dialysate RFP-400 (K 2 / Ca 3) 5000 milliLiter(s) (3500 mL/Hr) CRRT <Continuous>    MEDICATIONS  (PRN):  dextrose 40% Gel 15 Gram(s) Oral once PRN Blood Glucose LESS THAN 70 milliGRAM(s)/deciliter  glucagon  Injectable 1 milliGRAM(s) IntraMuscular once PRN Glucose LESS THAN 70 milligrams/deciliter      PHYSICAL EXAM:    ICU Vital Signs Last 24 Hrs  T(C): 34.1 (23 Oct 2019 14:32), Max: 37.2 (22 Oct 2019 17:47)  T(F): 93.4 (23 Oct 2019 14:32), Max: 99 (22 Oct 2019 17:47)  HR: 110 (23 Oct 2019 15:30) (100 - 132)  BP: 107/52 (23 Oct 2019 15:00) (69/51 - 167/82)  BP(mean): 74 (23 Oct 2019 15:00) (58 - 111)  ABP: 106/54 (23 Oct 2019 15:30) (58/40 - 166/78)  ABP(mean): 74 (23 Oct 2019 15:30) (48 - 118)  RR: 22 (23 Oct 2019 15:30) (12 - 24)  SpO2: 89% (23 Oct 2019 15:30) (87% - 97%)    I&O's Summary    22 Oct 2019 07:01  -  23 Oct 2019 07:00  --------------------------------------------------------  IN: 3035 mL / OUT: 1152 mL / NET: 1883 mL    23 Oct 2019 07:01  -  23 Oct 2019 16:15  --------------------------------------------------------  IN: 79.7 mL / OUT: 34 mL / NET: 45.7 mL      Mode: standby,Patient in operating room    GENERAL: intubated  HEAD:  Atraumatic, Normocephalic  EYES: EOMI, PERRLA, conjunctiva and sclera clear, minimal pupillary response b/l  NERVOUS SYSTEM:  Unresponsive to painful stimuli, No corneal reflex, no gag reflex  ENT: Moist mucous membranes  NECK: Supple, Double lumen R IJ catheter in place and functioning  CHEST/LUNG: B/L good air entry; No rales or rhonchi, on AC, ET tube in appropriate position, minimal serosanguinous secretions  HEART: Sinus tachycardia; No murmurs, rubs, or gallops  ABDOMEN: Soft, moderate distention, unable to assess for tenderness, midline dressing C/D/I, RLQ ostomy healthy appearing with serosanguinous drainage, Bowel sounds present. NGT in place to LIWS, draining bilious  : Rucker in place, little serosanguinous fluid  EXTREMITIES:  2+ palpable Peripheral DP/PT pulses b/l , cool to touch, no LE edema  SKIN: No rashes or lesions    LABS:                        9.8    3.59  )-----------( 113      ( 23 Oct 2019 14:10 )             31.3     10-23    149<H>  |  107  |  24<H>  ----------------------------<  43<LL>  5.5<H>   |  14<L>  |  2.89<H>    Ca    10.6<H>      23 Oct 2019 14:09  Phos  8.5     10-23  Mg     2.2     10-23    TPro  4.3<L>  /  Alb  2.2<L>  /  TBili  1.6<H>  /  DBili  x   /  AST  >7000<H>  /  ALT  1447<H>  /  AlkPhos  83  10-23    PT/INR - ( 23 Oct 2019 14:10 )   PT: 27.3 sec;   INR: 2.35          PTT - ( 23 Oct 2019 14:10 )  PTT:49.2 sec  Urinalysis Basic - ( 22 Oct 2019 14:14 )    Color: x / Appearance: x / SG: x / pH: x  Gluc: x / Ketone: x  / Bili: x / Urobili: x   Blood: x / Protein: x / Nitrite: x   Leuk Esterase: x / RBC: < 5 /HPF / WBC < 5 /HPF   Sq Epi: x / Non Sq Epi: 0-5 /HPF / Bacteria: Present /HPF      CAPILLARY BLOOD GLUCOSE      POCT Blood Glucose.: 143 mg/dL (23 Oct 2019 15:30)  POCT Blood Glucose.: 196 mg/dL (23 Oct 2019 14:55)  POCT Blood Glucose.: 35 mg/dL (23 Oct 2019 14:41)  POCT Blood Glucose.: 103 mg/dL (23 Oct 2019 11:07)  POCT Blood Glucose.: 127 mg/dL (23 Oct 2019 09:55)  POCT Blood Glucose.: 84 mg/dL (23 Oct 2019 09:08)  POCT Blood Glucose.: 94 mg/dL (23 Oct 2019 08:21)  POCT Blood Glucose.: 153 mg/dL (23 Oct 2019 06:58)  POCT Blood Glucose.: 30 mg/dL (23 Oct 2019 06:35)  POCT Blood Glucose.: 34 mg/dL (23 Oct 2019 06:34)  POCT Blood Glucose.: 90 mg/dL (22 Oct 2019 21:54)    ABG - ( 23 Oct 2019 14:08 )  pH, Arterial: 7.20  pH, Blood: x     /  pCO2: 40    /  pO2: 61    / HCO3: 15    / Base Excess: -12.0 /  SaO2: 89                  RADIOLOGY & ADDITIONAL TESTS:    Consultant(s) Notes Reviewed:  [x ] YES  [ ] NO    Care Discussed with Consultants/Other Providers [ x] YES  [ ] NO

## 2019-10-23 NOTE — BRIEF OPERATIVE NOTE - OPERATION/FINDINGS
Exploratory laparotomy revealed infarcted bowel from mid-transverse colon to rectum and necrotic right ovary and fallopian tube. R ELSY-visceral bypass graft explored, with open thrombectomy of both renal limbs using #2 and #3 Jez balloons, restoring a palpable pulse. SMA limb had a palpable pulse. Omentum closed over bypass graft. Necrotic R ovary and fallopian tube resected. Moderate bleeding from distal pancreas noted, oversewn. Subtotal colectomy, end colostomy, and proctosopy performed by General Surgery team.

## 2019-10-23 NOTE — PROGRESS NOTE ADULT - SUBJECTIVE AND OBJECTIVE BOX
INTERVAL HISTORY:  remains intubated, hypotensive, no urine output  	  MEDICATIONS:  norepinephrine Infusion 0.05 MICROgram(s)/kG/Min IV Continuous <Continuous>  phenylephrine    Infusion 1 MICROgram(s)/kG/Min IV Continuous <Continuous>        fentaNYL   Infusion. 0.5 MICROgram(s)/kG/Hr IV Continuous <Continuous>  propofol Infusion 14.62 MICROgram(s)/kG/Min IV Continuous <Continuous>    pantoprazole  Injectable 40 milliGRAM(s) IV Push daily    dextrose 40% Gel 15 Gram(s) Oral once PRN  dextrose 50% Injectable 12.5 Gram(s) IV Push once  dextrose 50% Injectable 25 Gram(s) IV Push once  dextrose 50% Injectable 25 Gram(s) IV Push once  glucagon  Injectable 1 milliGRAM(s) IntraMuscular once PRN  hydrocortisone sodium succinate Injectable 50 milliGRAM(s) IV Push every 8 hours  insulin lispro (HumaLOG) corrective regimen sliding scale   SubCutaneous Before meals and at bedtime  vasopressin Infusion 0.04 Unit(s)/Min IV Continuous <Continuous>    chlorhexidine 0.12% Liquid 15 milliLiter(s) Oral Mucosa every 12 hours  dextrose 10%. 1000 milliLiter(s) IV Continuous <Continuous>  dextrose 5%. 1000 milliLiter(s) IV Continuous <Continuous>  influenza   Vaccine 0.5 milliLiter(s) IntraMuscular once        PHYSICAL EXAM:  T(C): 37.1 (10-23-19 @ 06:13), Max: 37.2 (10-22-19 @ 17:47)  HR: 110 (10-23-19 @ 07:00) (82 - 132)  BP: 111/69 (10-23-19 @ 07:00) (69/51 - 167/82)  RR: 16 (10-23-19 @ 07:00) (12 - 22)  SpO2: 94% (10-23-19 @ 07:00) (90% - 99%)  Wt(kg): --  I&O's Summary    22 Oct 2019 07:01  -  23 Oct 2019 07:00  --------------------------------------------------------  IN: 2800.8 mL / OUT: 983 mL / NET: 1817.8 mL          Appearance: sedated on vent  HEENT:    Intubated	  Cardiovascular: Normal S1 S2, No JVD, No murmurs, trace edema  Respiratory: Lungs clear to auscultation	  Skin: No rashes, No ecchymoses, No cyanosis  Extremities:  , No clubbing, cyanosis, trace edema  Vascular: Peripheral pulses palpable     TELEMETRY: 	    ECG:  	  RADIOLOGY:   DIAGNOSTIC TESTING:  [ ] Echocardiogram:  [ ]  Catheterization:  [ ] Stress Test:    OTHER: 	    LABS:	 	    CARDIAC MARKERS:                                  12.9   9.84  )-----------( 54       ( 23 Oct 2019 04:09 )             40.1     10-23    136  |  107  |  28<H>  ----------------------------<  78  6.9<HH>   |  15<L>  |  3.51<H>    Ca    8.0<L>      23 Oct 2019 04:09  Phos  6.1     10-23  Mg     2.3     10-23    TPro  4.9<L>  /  Alb  2.3<L>  /  TBili  0.2  /  DBili  x   /  AST  271<H>  /  ALT  78<H>  /  AlkPhos  74  10-22    proBNP:   Lipid Profile:   HgA1c:   TSH:     ASSESSMENT/PLAN:

## 2019-10-23 NOTE — BRIEF OPERATIVE NOTE - NSICDXBRIEFPROCEDURE_GEN_ALL_CORE_FT
PROCEDURES:  Open subtotal colectomy 23-Oct-2019 12:34:03 with end colostomy Jose Gibson  Exploratory laparotomy 23-Oct-2019 12:33:19  Jose Gibson

## 2019-10-23 NOTE — PROGRESS NOTE ADULT - SUBJECTIVE AND OBJECTIVE BOX
Bedside postop check with team 5, SICU team and vascular residents present.     Patient off vasopressin with pressor requirements downtrending. Still has not woken up or returned reflexes (2 hours postop) despite being off sedation or narcotics. Will continue to wait. CVVHD set up. Bladder pressure 15. Postop labs reviewed with hgb 9.8. LFTs high (could explain sedation/paralytic not clearing super quickly). K 5.5 (CVVHD). Vemtilating well. Abdomen softly distended but tense. RUQ ostomy with some oozing. Inspected ostomy with specimen tube and mucosa is perfused. Unchanged in pink-purple color from operating room. Distal pedal pulses easily palpated.     For now will continue intensive monitoring. Keep NGT to LIWS. Serial exams of abdomen and ostomy. Bladder pressures. Hold sedation to try to get neuro exam as sedation and paralytics wear off. Family is informed and bedside. Plan discussed with teams.

## 2019-10-23 NOTE — PROGRESS NOTE ADULT - SUBJECTIVE AND OBJECTIVE BOX
Patient is a 84y Female seen and evaluated at bedside.   pt seen and examined  s/p exploratory laparatomy today--. both renal arteries thrombosed, R oophorectomy 2nd to necrosis, infarcted bowel from mid-transverse colon to rectum and necrotic right ovary and fallopian tube.  s/p colostomy tube   on 2 pressors- levophed, and vasopressin  had about 300 ml of blood loss, 1000 cc of crystalloids, 500 cc 5% albumin, RBC x3, FFP x2, Plts x2, Cryo x2      chlorhexidine 0.12% Liquid 15 two times a day  CRRT Treatment  <Continuous>  dextrose 40% Gel 15 once PRN  dextrose 5%. 1000 <Continuous>  dextrose 50% Injectable 12.5 once  dextrose 50% Injectable 25 once  dextrose 50% Injectable 25 once  fentaNYL   Infusion. 0.5 <Continuous>  glucagon  Injectable 1 once PRN  hydrocortisone sodium succinate Injectable 50 every 8 hours  influenza   Vaccine 0.5 once  insulin lispro (HumaLOG) corrective regimen sliding scale  Before meals and at bedtime  norepinephrine Infusion 0.05 <Continuous>  pantoprazole  Injectable 40 daily  piperacillin/tazobactam IVPB.. 2.25 once  piperacillin/tazobactam IVPB.. 2.25 every 6 hours  piperacillin/tazobactam IVPB..    propofol Infusion 14.62 <Continuous>  PureFlow Dialysate RFP-400 (K 2 / Ca 3) 5000 <Continuous>  vancomycin  IVPB 1000 once  vasopressin Infusion 0.04 <Continuous>      Allergies    No Known Allergies    Intolerances        T(C): , Max: 37.2 (10-22-19 @ 17:47)  T(F): , Max: 99 (10-22-19 @ 17:47)  HR: 124 (10-23-19 @ 08:00)  BP: 91/64 (10-23-19 @ 08:00)  BP(mean): 70 (10-23-19 @ 08:00)  RR: 14 (10-23-19 @ 08:00)  SpO2: 89% (10-23-19 @ 08:00)  Wt(kg): --    10-22 @ 07:01  -  10-23 @ 07:00  --------------------------------------------------------  IN: 3035 mL / OUT: 1152 mL / NET: 1883 mL    10-23 @ 07:01  -  10-23 @ 13:58  --------------------------------------------------------  IN: 79.7 mL / OUT: 34 mL / NET: 45.7 mL          Review of Systems:  NA    PHYSICAL EXAM:  GENERAL: intubated, sedated   HEAD:  Atraumatic, Normocephalic,   EYES: Bilateral conjunctiva and sclera normal   Oral cavity: ET in place  NECK: Neck supple, No JVD  CHEST/LUNG: limited exam   HEART: Regular rate and rhythm. MIGUELANGEL II/VI at LPSB, No gallop, no rub   ABDOMEN: midline abdominal incision covered by dressing, + colostomy bag in place    EXTREMITIES: No clubbing, cyanosis, or edema  Neurology: sedated  SKIN: No rashes or lesions  : rucker cath in place with minimal to zero urine         LABS:                        12.1   6.31  )-----------( 23       ( 23 Oct 2019 08:00 )             38.3     10-23    138  |  108  |  23  ----------------------------<  116<H>  5.5<H>   |  12<L>  |  2.88<H>    Ca    8.0<L>      23 Oct 2019 08:07  Phos  6.2     10-23  Mg     2.2     10-23    TPro  4.5<L>  /  Alb  1.9<L>  /  TBili  0.5  /  DBili  <0.2  /  AST  1026<H>  /  ALT  253<H>  /  AlkPhos  94  10-23    Hepatitis B Surface Antibody: Indeterminate (10-22 @ 18:02)  Hepatitis C Virus S/CO Ratio: 0.10 S/CO (10-22 @ 18:02)    PT/INR - ( 23 Oct 2019 08:00 )   PT: 22.5 sec;   INR: 1.95          PTT - ( 23 Oct 2019 08:00 )  PTT:53.3 sec  Urinalysis Basic - ( 22 Oct 2019 14:14 )    Color: x / Appearance: x / SG: x / pH: x  Gluc: x / Ketone: x  / Bili: x / Urobili: x   Blood: x / Protein: x / Nitrite: x   Leuk Esterase: x / RBC: < 5 /HPF / WBC < 5 /HPF   Sq Epi: x / Non Sq Epi: 0-5 /HPF / Bacteria: Present /HPF      Osmolality, Random Urine: 313 mosmol/kg (10-22 @ 14:11)  Potassium, Random Urine: 12 mmol/L (10-22 @ 14:11)  Sodium, Random Urine: 132 mmol/L (10-22 @ 14:11)  Creatinine, Random Urine: 6 mg/dL (10-22 @ 14:11)  Chloride, Random Urine: 131 mmol/L (10-22 @ 14:11)        RADIOLOGY & ADDITIONAL STUDIES:

## 2019-10-23 NOTE — PROGRESS NOTE ADULT - ASSESSMENT
87 y o  female with pmh of Htn admitted for Thoracoabdominal aneurysm. S/p TEVAR.   Hospital course complicated by severe lactic acidosis, hyperkalemia, likely 2nd to bowel ischemia.   S.p urgent exploratory laparatomy with findings of blt renal arteries thrombosed, R ovaries necrosed and bowel ischemia.       Flaquita  anuric   likely 2nd to blt renal artery thrombosed  renal recovery pretty unlikely  on 2 pressors  will resume CVVHD at current settings while awaiting for labs: DFR 2500 ml/hr, BFR @250 cc/min, and UFR to meet net even fluid balance   UFR to be started @ 25 cc/hr then progressively increase by increment of 25  will follow post op labs   will adjust CVVHd settings as needed  please monitor k, calcium, mag, phos q 6hrs while on CVVHD

## 2019-10-23 NOTE — PROGRESS NOTE ADULT - SUBJECTIVE AND OBJECTIVE BOX
10/23: phenyle maxed out, levo started, tachy to 114, AM FS 30, d10 restarted, ECHO ordered, AM cortisol sent, stress steroid 50mg q8, EKG peaked T's,   ON: 200 ml removed during HD before cath clotted off, K 5.4 from 6.7, repeat 5.8 on gas vs 6.1 on BMP (renal ok with this for now); unable to salvage HD cath (tried cathflo); put on contact per Dr. 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; non-bloody soft BM; not moving legs but moves arms, switched to phenylephrine and increased goal MAP to 95; HIT negative    patient seen and examined at bedside with SICU team and SICU attending   patient is currently intubated and off pressors   currently only on levo gtt for pressor support   mechanical ventilation settings with FiO2 of 100% and PEEP of 8   patient just returned from OR take back during which an exploratory laparotomy was performed demonstrating dusky sigmoid / left and transverse colon, the patient had colon removed and end colostomy brought up, SMA was patent, b/l renal graft thrombectomies performed, abdomen was closed   currently patient has minimal neurologic status, not responding to verbal or physical stimuli and not moving lower extremities     ICU Vital Signs Last 24 Hrs  T(C): 34.1 (23 Oct 2019 14:32), Max: 37.2 (22 Oct 2019 17:47)  T(F): 93.4 (23 Oct 2019 14:32), Max: 99 (22 Oct 2019 17:47)  HR: 112 (23 Oct 2019 14:45) (94 - 132)  BP: 95/55 (23 Oct 2019 14:45) (69/51 - 167/82)  BP(mean): 70 (23 Oct 2019 14:45) (58 - 111)  ABP: 106/58 (23 Oct 2019 14:45) (58/40 - 166/78)  ABP(mean): 78 (23 Oct 2019 14:45) (48 - 118)  RR: 24 (23 Oct 2019 14:45) (12 - 24)  SpO2: 93% (23 Oct 2019 14:45) (87% - 97%)    I&O's Summary    22 Oct 2019 07:01  -  23 Oct 2019 07:00  --------------------------------------------------------  IN: 3035 mL / OUT: 1152 mL / NET: 1883 mL    23 Oct 2019 07:01  -  23 Oct 2019 15:05  --------------------------------------------------------  IN: 79.7 mL / OUT: 34 mL / NET: 45.7 mL                          9.8    3.59  )-----------( 113      ( 23 Oct 2019 14:10 )             31.3     10-23    149<H>  |  107  |  24<H>  ----------------------------<  43<LL>  5.5<H>   |  14<L>  |  2.89<H>    Ca    10.6<H>      23 Oct 2019 14:09  Phos  8.5     10-23  Mg     2.2     10-23    TPro  4.3<L>  /  Alb  2.2<L>  /  TBili  1.6<H>  /  DBili  x   /  AST  >7000<H>  /  ALT  1447<H>  /  AlkPhos  83  10-23    Gen: minimal neuro status, off sedation   Resp: CTA, no obvious wheezes / rales / crackles   CV: RRR, no murmurs / rubs / gallops  HEENT: NGT in place to LIWS   Abd: provena vac in place, no response to abdominal palpation   abdomen currently soft   Vasc: 2+ palpable DP bilaterally     86 yo F female with PMH of HTN admitted for Thoracoabdominal aneurysm, s/p TEVAR (10/21/2019).  Hospital course complicated by severe lactic acidosis, hyperkalemia and anuria, now s/p urgent exploratory laparatomy, thrombectomy of b/l renal arteries, R salpingoophorectomy, and colectomy w/ end colostomy.   - patient remains intubated and sedated with minimal neuro status at this time   - patient on levo gtt, will titrate to MAP goal > 65   - IV abx with Vanc / Zosyn   - resuming CVVH per renal, will monitor for any return of urine   -remainder of care per SICU

## 2019-10-23 NOTE — BRIEF OPERATIVE NOTE - NSICDXBRIEFPROCEDURE_GEN_ALL_CORE_FT
PROCEDURES:  Vascular surgery procedure 23-Oct-2019 12:34:26  Jose Gibson  Open subtotal colectomy 23-Oct-2019 12:34:03 with end colostomy Jose Gibson  Exploratory laparotomy 23-Oct-2019 12:33:19  Jose Gibson  Vascular surgery procedure 21-Oct-2019 20:43:45 TEVAR with visceral debranching (R ELSY to bilateral renal and SMA bypass) Jose Gibson PROCEDURES:  Vascular surgery procedure 23-Oct-2019 12:34:26 exploration of R ELSY-visceral bypass with thrombectomy of bilateral renal limbs Jose Gibson  Open subtotal colectomy 23-Oct-2019 12:34:03 with end colostomy Jose Gibson  Exploratory laparotomy 23-Oct-2019 12:33:19  Jose Gibson

## 2019-10-23 NOTE — PROGRESS NOTE ADULT - SUBJECTIVE AND OBJECTIVE BOX
24 hr events:  in [facility] - High K+ lvl - needed HD- cath inserted R groin - cahth clotted overnight - unclotted this morning and working - increased pressor needs during HD   SUBJECTIVE:  Pt is intubated and sedated.         MEDICATIONS  (STANDING):  chlorhexidine 0.12% Liquid 15 milliLiter(s) Oral Mucosa every 12 hours  CRRT Treatment    <Continuous>  dextrose 5%. 1000 milliLiter(s) (50 mL/Hr) IV Continuous <Continuous>  dextrose 50% Injectable 12.5 Gram(s) IV Push once  dextrose 50% Injectable 25 Gram(s) IV Push once  dextrose 50% Injectable 25 Gram(s) IV Push once  fentaNYL   Infusion. 0.5 MICROgram(s)/kG/Hr (2.85 mL/Hr) IV Continuous <Continuous>  influenza   Vaccine 0.5 milliLiter(s) IntraMuscular once  insulin lispro (HumaLOG) corrective regimen sliding scale   SubCutaneous Before meals and at bedtime  pantoprazole  Injectable 40 milliGRAM(s) IV Push daily  phenylephrine    Infusion 1 MICROgram(s)/kG/Min (10.688 mL/Hr) IV Continuous <Continuous>  propofol Infusion 14.62 MICROgram(s)/kG/Min (5 mL/Hr) IV Continuous <Continuous>  PureFlow Dialysate RFP-400 (K 2 / Ca 3) 5000 milliLiter(s) (2000 mL/Hr) CRRT <Continuous>    MEDICATIONS  (PRN):  dextrose 40% Gel 15 Gram(s) Oral once PRN Blood Glucose LESS THAN 70 milliGRAM(s)/deciliter  glucagon  Injectable 1 milliGRAM(s) IntraMuscular once PRN Glucose LESS THAN 70 milligrams/deciliter      Butler:	  [ ] None	[ ] Daily Butler Order Placed	   Indication:	  [ ] Strict I and O's    [ ] Obstruction     [ ] Incontinence + Stage 3 or 4 Decubitus  Central Line:  [ ] None	   [ ]  Medication / TPN Administration     Drips:     ICU Vital Signs Last 24 Hrs  T(C): 37.1 (23 Oct 2019 06:13), Max: 37.2 (22 Oct 2019 17:47)  T(F): 98.8 (23 Oct 2019 06:13), Max: 99 (22 Oct 2019 17:47)  HR: 132 (23 Oct 2019 06:00) (82 - 132)  BP: 69/51 (23 Oct 2019 06:00) (69/51 - 167/82)  BP(mean): 58 (23 Oct 2019 06:00) (58 - 111)  ABP: 58/40 (23 Oct 2019 06:00) (58/40 - 168/68)  ABP(mean): 48 (23 Oct 2019 06:00) (48 - 106)  RR: 18 (23 Oct 2019 06:00) (12 - 22)  SpO2: 90% (23 Oct 2019 06:00) (90% - 99%)      Physical Exam:  General: NAD  HEENT: NC/AT, EOMI, PERRLA neck supple w/o LAD, Ng tube ib interm suction with bilious output.   Pulmonary: On Vent with 400/16 - good waveform - good sat - AGBs with good pCO2, CTA-B  Cardiovascular: NSR, no murmurs, tachycardic @120s on pressors   Abdominal: soft, NT/ND, +BS loose, mild hematoma on lower section of the incision.   Extremities: WWP, pt wont move LEs. moves upper ext. No cyanosis/edema/erythema.   Neuro: unable to move LEs spontaneous or with command during neurochecks.   Pulses: palpable distal pulses (DP)    Lines/tubes/drains:  Dual lumen HJD catheter (declotted once) R fem vein    Vent settings:  Mode: AC/ CMV (Assist Control/ Continuous Mandatory Ventilation), RR (machine): 16, TV (machine): 400, FiO2: 100, PEEP: 7, ITime: 1, MAP: 8.7, PIP: 12    I&O's Summary    21 Oct 2019 07:01  -  22 Oct 2019 07:00  --------------------------------------------------------  IN: 1865.3 mL / OUT: 1488 mL / NET: 377.3 mL    22 Oct 2019 07:01  -  23 Oct 2019 06:35  --------------------------------------------------------  IN: 2800.8 mL / OUT: 983 mL / NET: 1817.8 mL        LABS:                        12.9   9.84  )-----------( 54       ( 23 Oct 2019 04:09 )             40.1     10-23    136  |  107  |  28<H>  ----------------------------<  78  6.9<HH>   |  15<L>  |  3.51<H>    Ca    8.0<L>      23 Oct 2019 04:09  Phos  6.1     10-23  Mg     2.3     10-23    TPro  4.9<L>  /  Alb  2.3<L>  /  TBili  0.2  /  DBili  x   /  AST  271<H>  /  ALT  78<H>  /  AlkPhos  74  10-22    PT/INR - ( 23 Oct 2019 05:05 )   PT: 18.7 sec;   INR: 1.63          PTT - ( 23 Oct 2019 05:05 )  PTT:39.9 sec  Urinalysis Basic - ( 22 Oct 2019 14:14 )    Color: x / Appearance: x / SG: x / pH: x  Gluc: x / Ketone: x  / Bili: x / Urobili: x   Blood: x / Protein: x / Nitrite: x   Leuk Esterase: x / RBC: < 5 /HPF / WBC < 5 /HPF   Sq Epi: x / Non Sq Epi: 0-5 /HPF / Bacteria: Present /HPF      CAPILLARY BLOOD GLUCOSE      POCT Blood Glucose.: 90 mg/dL (22 Oct 2019 21:54)  POCT Blood Glucose.: 135 mg/dL (22 Oct 2019 15:28)  POCT Blood Glucose.: 124 mg/dL (22 Oct 2019 11:44)    LIVER FUNCTIONS - ( 22 Oct 2019 19:19 )  Alb: 2.3 g/dL / Pro: 4.9 g/dL / ALK PHOS: 74 U/L / ALT: 78 U/L / AST: 271 U/L / GGT: x             Cultures:    RADIOLOGY & ADDITIONAL STUDIES:

## 2019-10-23 NOTE — PROGRESS NOTE ADULT - ASSESSMENT
84F with HTN, admitted for treatment of a thoracoabdominal aortic aneurysm (Winn IV), s/p ex lap TEVAR with visceral debranching (R ELSY to bilateral renal and SMA bypass), splenectomy for large venous bleeding).    Neuro: Off sedation, Hourly neuro checks  CV: Sinus tachycardia, on Levophed with goal SBP>100. Stat repeat Echocardiogram postop with bubble study.  Preop Echo 55%, with normal stress test. Repeat labs Q4 hrs.  Pulm: intubated, vent 40/400/18/10. F/u CXR  FENGI: NPO, NGT to LIWS, PPI. Bladder pressure check every 4 hrs.   ID: Vanc 1000 q 24 hrs (10/23-  ), Zosyn 2.25 (10/23), plan to check daily vanc level  : anuric, on CVVHD  Endo: ISS, D10 for post op FS 34.  PPx: SCDs, hold SQH. Stat lower extremity duplex pending  Lines: RIJ introducer w/ 2 ports (10/21 - ), L a-line (10/21 - )  Wounds/drains: RLQ end colostomy, midline incision covered in prevena vac  PT/OT: not ordered 84F with HTN, admitted for treatment of a thoracoabdominal aortic aneurysm (Winn IV), s/p ex lap TEVAR with visceral debranching (R ELSY to bilateral renal and SMA bypass), splenectomy for large venous bleeding) (10/21), c/b decreased blood flow to b/l renal artery, in septic shock this am, now s/p s/p exploration of R ELSY-visceral bypass with thrombectomy of bilateral renal limbs, subtotal colectomy, end colostomy, oophorectomy and oversewing of pancreatic tail (10/23)    Neuro: Off sedation, Hourly neuro checks  CV: Sinus tachycardia, on Levophed with goal SBP>100. Stat repeat Echocardiogram postop with bubble study.  Preop Echo 55%, with normal stress test. Repeat labs Q4 hrs.  Pulm: intubated, vent 40/400/18/10. F/u CXR  FENGI: NPO, NGT to LIWS, PPI. Bladder pressure check every 4 hrs.   ID: Vanc 1000 q 24 hrs (10/23-  ), Zosyn 2.25 (10/23), plan to check daily vanc level  : anuric, on CVVHD  Endo: ISS, D10 for post op FS 34.  PPx: SCDs, hold SQH. Stat lower extremity duplex pending  Lines: RIJ introducer w/ 2 ports (10/21 - ), L a-line (10/21 - )  Wounds/drains: RLQ end colostomy, midline incision covered in prevena vac  PT/OT: not ordered

## 2019-10-23 NOTE — PROVIDER CONTACT NOTE (CHANGE IN STATUS NOTIFICATION) - ACTION/TREATMENT ORDERED:
New BP goal parameter is MAP >95, plan to start neosynephrine and titrate to MAP>95. Will continue to monitor for neuro checks

## 2019-10-23 NOTE — PROGRESS NOTE ADULT - ASSESSMENT
84F with HTN, admitted for treatment of a thoracoabdominal aortic aneurysm (Winn IV), s/p ex lap TEVAR with visceral debranching (R ELSY to bilateral renal and SMA bypass), splenectomy for large venous bleeding).    Neuro: fentanyl/propofol for sedation, neurochecks for LE weakness, paresis  CV: Echo 55%, normal stress test; LR @75, goal SBP>100 or map >65. Phenylephrine gtt - add more pressor support if needed. F/U repeat am labs  Pulm: intubated, vent 40/400/14/7,, f/u repeat ABG and VBG  FENGI: NPO, NGT, PPI  ID: ancef x3 doses  : Butler in place, closely monitor UO, CVVHD started overnight  Endo: ISS  PPx: SCDs, hold SQH  Lines: RIJ introducer w/ 2 ports (10/21 - ), L a-line (10/21 - ), R fem vein dual lumen HD catheter  Wounds/drains: DANAE LUQ (10/21 - ), L groin cutdown, R 5Fr sheath (removed), midline incision  PT/OT: not ordered

## 2019-10-24 NOTE — PROGRESS NOTE ADULT - PROBLEM SELECTOR PLAN 3
Now complicated by post op hypotension and loss of urine output.  On pressors, no evidence of bleeding, Hct 40.  Prognosis poor
Reportedly labile, will observe
Now complicated by post op hypotension and loss of urine output.  On pressors, no evidence of bleeding, Hct 40
Reportedly labile, will observe.  On Norvasc
Reportedly labile, will observe.  On Norvasc Preop.  Readings labile
cont. hydralazine PRN, per Renal and Cardiology recs; has been started on standing Norvasc as well
cont. hydralazine PRN, per Renal and Cardiology recs; has been started on standing Norvasc as well
cont. hydralazine PRN, f/u Renal and Cardiology recs

## 2019-10-24 NOTE — PROGRESS NOTE ADULT - PROBLEM SELECTOR PROBLEM 1
Preop cardiovascular exam
Abdominal aortic aneurysm (AAA) without rupture

## 2019-10-24 NOTE — DISCHARGE NOTE FOR THE EXPIRED PATIENT - HOSPITAL COURSE
84 F PMH of HTN, [facility], recent increasing abdominal pain and plan for aortic aneurysm repair admitted to [facility] 10/17 for preop optimization. Pt preopped and cleared by cardiology with Echo EF 55%, normal stress test. Pt found to have Ecoli UTI s/p Ceftriaxone, essential hypertension managed with IV Hydralazine and Amlodipine. Pt taken to OR 10/21 for ex-lap, TEVAR with visceral debranching (R ELSY to bilateral renal and SMA bypass), splenectomy. Postoperatively she was admitted to SICU intubated for hemodynamic monitoring and neuro-monitoring. Postoperatively pt was on Clevidipine drip for hypertension SBP 220s, lactate 11.2 and improving. On 10/22 POD1 pt noted to have decreased urine output and became anuric in the late morning. Renal duplex US demonstrated evidence of diffusely and significantly diminished blood flow in both kidneys with no evidence of urinary obstruction. IVF and Heparin DC'd, That afternoon pt with  worsening metabolic acidosis, lactate 4 from 3.8. and K 6.7, EKG with no changes, Nephrology () called for urgent HD, R femoral HD catheter placed, pt started on Levophed that evening due to hypotension during HD. On 10/23 POD2 pt unable to tolerate HD and was started on CVVHD, worsening metabolic acidosis, lactate increased to 9.3, and decision made to return to OR class one for exploratory laparotomy, and underwent  bilateral renal thrombectomy, right salpingectomy and right oophrectomy, oversewing of pancreatic tail, omentectomy, subtotal colectomy with end colostomy. Pt returned to SICU intubated, off sedation without response to verbal or painful stimuli, on levophed 5, off vasopressing, saturating 88-92% on AC FiO2, CXR with no evidence of acute lung infiltrates, pneumothorax or significant pleural effusion, minimal serosanguinous secretions.  CVVHD restarted immediately at bedside, echocardiogram pending at bedside with bubble st. No urine output via rucker catheter. ptt with increasing acidosis and worsening lactate to 19, worsening LFTs demonstrated shock liver.  Lactulose started for Ammonia 165. . On 10/24 pt off sedation, unresponsive to voice, pain, no gag reflex, no corneal reflex, continued worsening acidosis. Family notified of poor prognosis and family decided to cap all therapies, make pt DNR and stop CVVHD.  At 10:42 SICU team notified that patient in asystole. Pt examined at bedside, in asystole, with no cardiac sounds on examination, no pulse, with continued respirations secondary to ventilation. Pt pronounced dead at 10:45 am.

## 2019-10-24 NOTE — PROGRESS NOTE ADULT - PROBLEM SELECTOR PLAN 2
Etiology unclear.  She is a non-smoker
Etiology unclear.  She is a non-smoker and current BP is NL on no meds
cont. CTX (day #3/3-6), f/u culture
earnest rizzo/w ceftriaxone
cont. CTX (day #2/3-6), f/u culture

## 2019-10-24 NOTE — PROGRESS NOTE ADULT - ATTENDING COMMENTS
will follow w/ you
will follow w/ you
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  No gag no cough no pupillary corneal reflexes.  No spontaneous breathing  will discuss with vascular regarding further management of her condition
Patient seen and examined with house-staff during bedside rounds.  Resident note read, including vitals, physical findings, laboratory data, and radiological reports.   Revisions included below.  Direct personal management at bed side and extensive interpretation of the data.  Plan was outlined and discussed in details with the housestaff.  Decision making of high complexity  Action taken for acute disease activity to reflect the level of care provided:  - medication reconciliation  - review laboratory data  Acute respiratory failure with shock, possible adrenal insufficiency, acute renal failure requiring hemodialysis, liver failure with hypoglycemia and coagulopathy, anemia, thrombocytopenia, ischemic bowel, toxic metabolic encephalopathy    Patient was seen several times during the day and her condition deteriorated over the course of the day. ventilatory setting were adjusted to correct for the metabolic acidosis. The patient is to require pressors. Random cortisol level was sent. Ordered echocardiogram but the patient went to do warm. Patient was doing and determine. Patient was started on stress dose hydrocortisone. Patient is on hemodialysis. Followup ammonia level. Patient is off sedation. Findings on the yan discussed
reviewed case in detail with Dr. Tavarez last PM- and again this AM  Acute HD yesterday and transitioned to CVVHD  urgent OR earlier today to manage ischemic/necrotic bowel.  Seen and evaluated again while on CVVHD  to increase Qd to afford better clearance of lactate.  Increase Qb as tolerated presently c/w 250 ml/min
Patient seen and examined with house-staff during bedside rounds.  Resident note read, including vitals, physical findings, laboratory data, and radiological reports.   Revisions included below.  Direct personal management at bed side and extensive interpretation of the data.  Plan was outlined and discussed in details with the housestaff.  Decision making of high complexity  Action taken for acute disease activity to reflect the level of care provided:  - medication reconciliation  - review laboratory data   Respiratory failure shock renal failure lactic acidosis HTN, admitted for treatment of a thoracoabdominal aortic aneurysm (Winn IV), s/p ex lap TEVAR with visceral debranching (R ELSY to bilateral renal and SMA bypass), splenectomy for large venous bleeding).  The patient was seen several times during the day. The ventilatory settings were adjusted. Patient was a synchronous with the ventilator on increased his tidal volume to repeat pressure is 18. The CVP was 3 with increase in the creatinine. This center venous saturation was high. Ultrasound revealed no blood flow to the kidneys. A dialysis catheter was inserted. Blood tests are still elevated. Patient is on hemodialysis. Monitor for bowel ischemia.
will follow w/ you

## 2019-10-24 NOTE — PROGRESS NOTE ADULT - SUBJECTIVE AND OBJECTIVE BOX
Patient is a 84y old  Female who presents with a chief complaint of Thoracoabdominal Aneurysm (23 Oct 2019 18:11)      INTERVAL HPI/OVERNIGHT EVENTS: Afebrile overnight requiring increasing dose of Levophed and Vasopressin, sating in low 90s on FiO2 of 100, Tidal volume 400,  RR22, PEEP 10. Pt off sedation, unresponsive to voice, pain, no gag reflex. Lactulose started for Ammonia 165. Increasingly acidotic overnight, recieved total of 4 amps of bicarb.  Pt continues to be anuric on CVVHD with low flow throughout the night. NGT to LIWS. Bladder pressures below 20 throughout the evening. Lactate increasing throughout the night, 19 this morning.     MEDICATIONS  (STANDING):  chlorhexidine 0.12% Liquid 15 milliLiter(s) Oral Mucosa two times a day  chlorhexidine 2% Cloths 1 Application(s) Topical daily  CRRT Treatment    <Continuous>  dextrose 10%. 1000 milliLiter(s) (40 mL/Hr) IV Continuous <Continuous>  dextrose 5%. 1000 milliLiter(s) (50 mL/Hr) IV Continuous <Continuous>  dextrose 50% Injectable 12.5 Gram(s) IV Push once  dextrose 50% Injectable 25 Gram(s) IV Push once  dextrose 50% Injectable 25 Gram(s) IV Push once  dextrose 50% Injectable 25 Gram(s) IV Push once  fentaNYL   Infusion. 0.5 MICROgram(s)/kG/Hr (2.85 mL/Hr) IV Continuous <Continuous>  hydrocortisone sodium succinate Injectable 50 milliGRAM(s) IV Push every 6 hours  influenza   Vaccine 0.5 milliLiter(s) IntraMuscular once  insulin lispro (HumaLOG) corrective regimen sliding scale   SubCutaneous Before meals and at bedtime  lactulose Syrup 20 Gram(s) Oral every 6 hours  norepinephrine Infusion 0.05 MICROgram(s)/kG/Min (2.672 mL/Hr) IV Continuous <Continuous>  pantoprazole  Injectable 40 milliGRAM(s) IV Push daily  piperacillin/tazobactam IVPB.. 2.25 Gram(s) IV Intermittent every 6 hours  piperacillin/tazobactam IVPB..      propofol Infusion 14.62 MICROgram(s)/kG/Min (5 mL/Hr) IV Continuous <Continuous>  PureFlow Dialysate RFP-400 (K 2 / Ca 3) 5000 milliLiter(s) (3500 mL/Hr) CRRT <Continuous>  vasopressin Infusion 0.02 Unit(s)/Min (1.2 mL/Hr) IV Continuous <Continuous>    MEDICATIONS  (PRN):  dextrose 40% Gel 15 Gram(s) Oral once PRN Blood Glucose LESS THAN 70 milliGRAM(s)/deciliter  glucagon  Injectable 1 milliGRAM(s) IntraMuscular once PRN Glucose LESS THAN 70 milligrams/deciliter      PHYSICAL EXAM:    ICU Vital Signs Last 24 Hrs  T(C): 36.2 (24 Oct 2019 05:43), Max: 36.3 (24 Oct 2019 00:00)  T(F): 97.1 (24 Oct 2019 05:43), Max: 97.3 (24 Oct 2019 00:00)  HR: 106 (24 Oct 2019 05:30) (88 - 128)  BP: 186/88 (24 Oct 2019 05:00) (91/64 - 186/88)  BP(mean): 118 (24 Oct 2019 05:00) (64 - 118)  ABP: 204/78 (24 Oct 2019 05:00) (70/44 - 204/78)  ABP(mean): 126 (24 Oct 2019 05:00) (52 - 126)  RR: 24 (24 Oct 2019 05:30) (12 - 28)  SpO2: 90% (24 Oct 2019 05:30) (87% - 99%)    I&O's Summary    22 Oct 2019 07:01  -  23 Oct 2019 07:00  --------------------------------------------------------  IN: 3035 mL / OUT: 1152 mL / NET: 1883 mL    23 Oct 2019 07:01  -  24 Oct 2019 06:39  --------------------------------------------------------  IN: 2128.9 mL / OUT: 338 mL / NET: 1790.9 mL      Mode: AC/ CMV (Assist Control/ Continuous Mandatory Ventilation)  RR (machine): 22  TV (machine): 400  FiO2: 100  PEEP: 10  ITime: 1  MAP: 18  PIP: 30    GENERAL: intubated  HEAD:  Atraumatic, Normocephalic  EYES: EOMI, PERRLA, conjunctiva and sclera clear, minimal pupillary response b/l  NERVOUS SYSTEM:  Unresponsive to painful stimuli, No corneal reflex, no gag reflex  ENT: Moist mucous membranes  NECK: Supple, Double lumen R IJ catheter in place and functioning but positional  CHEST/LUNG: B/L good air entry; No rales or rhonchi, on AC, ET tube in appropriate position, minimal serosanguinous secretions  HEART: Sinus tachycardia; No murmurs, rubs, or gallops  ABDOMEN: Soft, increasingly mottled, moderate distention, unable to assess for tenderness, midline dressing C/D/I, RLQ ostomy slightly dusky this am, no evidence of necrosis with serosanguinous drainage, Bowel sounds present. NGT in place to LIWS, draining bilious  : Butler in place, little serosanguinous fluid  EXTREMITIES:  2+ palpable Peripheral DP/PT pulses b/l , cool to touch, no LE edema  SKIN: No rashes or lesions    LABS:                        8.3    4.91  )-----------( 52       ( 24 Oct 2019 04:33 )             26.9     10-24    142  |  104  |  10  ----------------------------<  122<H>  6.8<HH>   |  11<L>  |  1.73<H>    Ca    7.6<L>      24 Oct 2019 04:33  Phos  9.2     10-24  Mg     1.9     10-24    TPro  3.5<L>  /  Alb  1.9<L>  /  TBili  1.8<H>  /  DBili  x   /  AST  >74090  /  ALT  1639<H>  /  AlkPhos  233<H>  10-24    PT/INR - ( 24 Oct 2019 04:33 )   PT: 44.6 sec;   INR: 3.79          PTT - ( 24 Oct 2019 04:33 )  PTT:66.0 sec  Urinalysis Basic - ( 22 Oct 2019 14:14 )    Color: x / Appearance: x / SG: x / pH: x  Gluc: x / Ketone: x  / Bili: x / Urobili: x   Blood: x / Protein: x / Nitrite: x   Leuk Esterase: x / RBC: < 5 /HPF / WBC < 5 /HPF   Sq Epi: x / Non Sq Epi: 0-5 /HPF / Bacteria: Present /HPF      CAPILLARY BLOOD GLUCOSE      POCT Blood Glucose.: 77 mg/dL (23 Oct 2019 22:57)  POCT Blood Glucose.: 75 mg/dL (23 Oct 2019 21:27)  POCT Blood Glucose.: 135 mg/dL (23 Oct 2019 17:48)  POCT Blood Glucose.: 143 mg/dL (23 Oct 2019 15:30)  POCT Blood Glucose.: 196 mg/dL (23 Oct 2019 14:55)  POCT Blood Glucose.: 35 mg/dL (23 Oct 2019 14:41)  POCT Blood Glucose.: 103 mg/dL (23 Oct 2019 11:07)  POCT Blood Glucose.: 127 mg/dL (23 Oct 2019 09:55)  POCT Blood Glucose.: 84 mg/dL (23 Oct 2019 09:08)  POCT Blood Glucose.: 94 mg/dL (23 Oct 2019 08:21)  POCT Blood Glucose.: 153 mg/dL (23 Oct 2019 06:58)    ABG - ( 24 Oct 2019 05:47 )  pH, Arterial: 7.29  pH, Blood: x     /  pCO2: 28    /  pO2: 66    / HCO3: 13    / Base Excess: -12.4 /  SaO2: 90                  RADIOLOGY & ADDITIONAL TESTS:    Consultant(s) Notes Reviewed:  [x ] YES  [ ] NO    Care Discussed with Consultants/Other Providers [ x] YES  [ ] NO

## 2019-10-24 NOTE — CHART NOTE - NSCHARTNOTEFT_GEN_A_CORE
Overnight patient's clinical status worsening. Lactate 11.3 then 12.8 with bicarb trending down. In contact with renal fellow who recommended increasing CVVHD flow rate. Bicarb continued to trend down to 9 and lactate increased to 17 w/ pH 7.07. Gave 2 amps bicarb. Increased vent RR to 24 from 22. Started lactulose for ammonia 160s. Checked ostomy w/ general surgery chief. Some duskiness and scant bleeding however no edema, no mucocutaneous separation, no necrosis, no retraction. Sending off additional labs now. Have been in communication with vascular surgery chief and intensivist on call.

## 2019-10-24 NOTE — PROGRESS NOTE ADULT - REASON FOR ADMISSION
Thoracoabdominal Aneurysm

## 2019-10-24 NOTE — PROGRESS NOTE ADULT - PROVIDER SPECIALTY LIST ADULT
Cardiology
Internal Medicine
Nephrology
SICU
Surgery
Surgery
Vascular Surgery
SICU
Vascular Surgery
Surgery
Cardiology
Cardiology

## 2019-10-24 NOTE — CHART NOTE - NSCHARTNOTEFT_GEN_A_CORE
Family palliative care meeting    Pt marzena (HCP), daughter and son at bedside.  Pt poor prognosis discussed extensively, including worsening metabolic acidosis, unresponsive neurologic status, multisystem organ failure, and respiratory failure. Pt's family expressed understanding of poor prognosis and elected to make the patient DNR, with no further escalation of care, no further CVVHD. Pt made DNR and molst form signed. Family palliative care meeting    Pt samstefanie (HCP), daughter and son at bedside.  Pt poor prognosis discussed extensively, including worsening metabolic acidosis, unresponsive neurologic status, multisystem organ failure, and respiratory failure. Pt's family expressed understanding of poor prognosis and elected to make the patient DNR, with no further escalation of care, no further CVVHD. Pt made DNR and molst form signed. Dr. Baker and Vascular team made aware.

## 2019-10-24 NOTE — PROGRESS NOTE ADULT - PROBLEM SELECTOR PROBLEM 2
Abdominal aortic aneurysm (AAA) without rupture
E. coli UTI

## 2019-10-24 NOTE — PROGRESS NOTE ADULT - PROBLEM SELECTOR PLAN 1
84 y/pt cleared for open repair of an AAA.   Pharm Nuc is normal.  This is high risk surgery.  Seen POD # 3.  Maintaining NSR, BP now very low and no urine output
84 y/pt for open repair of an AAA.  Will need a Pharm Nuc.  This is high risk surgery
84 y/pt cleared for open repair of an AAA.   Pharm Nuc is normal.  This is high risk surgery
84 y/pt cleared for open repair of an AAA.   Pharm Nuc is normal.  This is high risk surgery.  Seen POD # 2.  Maintaining NSR, good distal pulses, BP now very low and no urine output
84 y/pt cleared for open repair of an AAA.   Pharm Nuc is normal.  This is high risk surgery.  Seen POD #1.  Maintaining NSR, good distal pulses, BP labile
mgmt per Vascular, plan for OR repair; pre-op evaluation per Cardiology -- TTE and stress test normal
for repair today
mgmt per Vascular, plan for OR repair; pre-op evaluation per Cardiology -- stress test normal

## 2019-10-24 NOTE — PROGRESS NOTE ADULT - ASSESSMENT
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 Stat repeat Echocardiogram postop with bubble study.  Preop Echo 55%, with normal stress test. Repeat labs Q4 hrs.  Pulm: intubated, vent 40/400/18/10. F/u CXR  FENGI: NPO, NGT to LIWS, PPI. Bladder pressure check every 4 hrs.   ID: Vanc 1000 q 24 hrs (10/23-  ), Zosyn 2.25 (10/23), plan to check daily vanc level  : anuric, on CVVHD  Endo: ISS, D10 for post op FS 34.  PPx: SCDs, hold SQH. Stat lower extremity duplex pending  Lines: RIJ introducer w/ 2 ports (10/21 - ), L a-line (10/21 - )  Wounds/drains: RLQ end colostomy, midline incision covered in prevena vac  PT/OT: not ordered

## 2019-10-24 NOTE — PROGRESS NOTE ADULT - SUBJECTIVE AND OBJECTIVE BOX
INTERVAL HISTORY:  Remains intubated, pressor dependent  	  MEDICATIONS:  norepinephrine Infusion 0.05 MICROgram(s)/kG/Min IV Continuous <Continuous>    piperacillin/tazobactam IVPB.. 2.25 Gram(s) IV Intermittent every 6 hours  piperacillin/tazobactam IVPB..          fentaNYL   Infusion. 0.5 MICROgram(s)/kG/Hr IV Continuous <Continuous>  propofol Infusion 14.62 MICROgram(s)/kG/Min IV Continuous <Continuous>    lactulose Syrup 20 Gram(s) Oral every 6 hours  pantoprazole  Injectable 40 milliGRAM(s) IV Push daily    dextrose 40% Gel 15 Gram(s) Oral once PRN  dextrose 50% Injectable 12.5 Gram(s) IV Push once  dextrose 50% Injectable 25 Gram(s) IV Push once  dextrose 50% Injectable 25 Gram(s) IV Push once  dextrose 50% Injectable 25 Gram(s) IV Push once  dextrose 50% Injectable 25 Gram(s) IV Push once  dextrose 50% Injectable 25 Gram(s) IV Push once  glucagon  Injectable 1 milliGRAM(s) IntraMuscular once PRN  hydrocortisone sodium succinate Injectable 50 milliGRAM(s) IV Push every 6 hours  insulin lispro (HumaLOG) corrective regimen sliding scale   SubCutaneous Before meals and at bedtime  vasopressin Infusion 0.02 Unit(s)/Min IV Continuous <Continuous>    calcium gluconate IVPB 2 Gram(s) IV Intermittent once  chlorhexidine 0.12% Liquid 15 milliLiter(s) Oral Mucosa two times a day  chlorhexidine 2% Cloths 1 Application(s) Topical daily  dextrose 10%. 1000 milliLiter(s) IV Continuous <Continuous>  dextrose 5%. 1000 milliLiter(s) IV Continuous <Continuous>  influenza   Vaccine 0.5 milliLiter(s) IntraMuscular once        PHYSICAL EXAM:  T(C): 36.2 (10-24-19 @ 05:43), Max: 36.3 (10-24-19 @ 00:00)  HR: 104 (10-24-19 @ 07:00) (88 - 128)  BP: 91/56 (10-24-19 @ 07:00) (91/56 - 186/88)  RR: 24 (10-24-19 @ 07:00) (12 - 28)  SpO2: 90% (10-24-19 @ 05:30) (87% - 99%)  Wt(kg): --  I&O's Summary    23 Oct 2019 07:01  -  24 Oct 2019 07:00  --------------------------------------------------------  IN: 2342.7 mL / OUT: 990 mL / NET: 1352.7 mL    24 Oct 2019 07:01  -  24 Oct 2019 08:12  --------------------------------------------------------  IN: 273.9 mL / OUT: 0 mL / NET: 273.9 mL          Appearance: On Vent  HEENT:   Intubated  Lymphatic: No lymphadenopathy  Cardiovascular: Normal S1 S2, No JVD, No murmurs, trace edema  Respiratory: Lungs clear to auscultation	  Gastrointestinal:  Soft,    Skin: No rashes, No ecchymoses, No cyanosis  Neurologic: sedated, no spontaneous movement  Extremities:  No clubbing, cyanosis   Vascular: feet cool    TELEMETRY: 	  Sinus tach  ECG:  	  RADIOLOGY:   DIAGNOSTIC TESTING:  [ ] Echocardiogram:  [ ]  Catheterization:  [ ] Stress Test:    OTHER: 	    LABS:	 	    CARDIAC MARKERS:                                  8.3    4.91  )-----------( 52       ( 24 Oct 2019 04:33 )             26.9     10-24    142  |  104  |  10  ----------------------------<  122<H>  6.8<HH>   |  11<L>  |  1.73<H>    Ca    7.6<L>      24 Oct 2019 04:33  Phos  9.2     10-24  Mg     1.9     10-24    TPro  3.5<L>  /  Alb  1.9<L>  /  TBili  1.8<H>  /  DBili  x   /  AST  >61131  /  ALT  1639<H>  /  AlkPhos  233<H>  10-24    proBNP:   Lipid Profile:   HgA1c:   TSH:     ASSESSMENT/PLAN:

## 2019-10-24 NOTE — PROGRESS NOTE ADULT - PROBLEM SELECTOR PROBLEM 3
Hypertension, unspecified type
Essential hypertension

## 2019-10-24 NOTE — CHART NOTE - NSCHARTNOTEFT_GEN_A_CORE
Called to bedside with concern for bleeding from ostomy.    Ostomy inspected, scant blood-tinged serous fluid in ostomy bag, WNL of what would be expected post-operatively. Stoma appliance taken down and mucosa inspected: no mucocutaneous separation, some duskiness noted of 70% of mucosa, minimal edema, easily digitized.    VS of note: T 92-94F, currently being warmed. -120s. BP 90-110s with pressor support. On CVVHD.  Abd otherwise ND, soft.  Extremities cool to touch, no mottling.    Notable labs: uptrending INR in 2's, uptrending lactate >10, K 5.8, HCO3 13, Cr 1.8, ammonia 165, AST >7000, ALT 1700.    Discussion had with attending General Surgeon Dr Wyatt and intensivist Dr Lynch; plan to initiate lactulose q6 with fluid supplementation and to continue monitoring colostomy. Continue supportive care with pressors, warming, CVVHD.

## 2019-10-24 NOTE — PROVIDER CONTACT NOTE (CRITICAL VALUE NOTIFICATION) - ACTION/TREATMENT ORDERED:
Potassium 6.8, MD Dougherty aware
500 ml bolus of LR to be given, fluids running. plan to trend lactate

## 2019-10-25 LAB — SURGICAL PATHOLOGY STUDY: SIGNIFICANT CHANGE UP

## 2019-10-28 LAB — SURGICAL PATHOLOGY STUDY: SIGNIFICANT CHANGE UP

## 2019-10-29 DIAGNOSIS — I10 ESSENTIAL (PRIMARY) HYPERTENSION: ICD-10-CM

## 2019-10-29 DIAGNOSIS — R65.21 SEVERE SEPSIS WITH SEPTIC SHOCK: ICD-10-CM

## 2019-10-29 DIAGNOSIS — I71.6 THORACOABDOMINAL AORTIC ANEURYSM, WITHOUT RUPTURE: ICD-10-CM

## 2019-10-29 DIAGNOSIS — K91.62 INTRAOPERATIVE HEMORRHAGE AND HEMATOMA OF A DIGESTIVE SYSTEM ORGAN OR STRUCTURE COMPLICATING OTHER PROCEDURE: ICD-10-CM

## 2019-10-29 DIAGNOSIS — N17.9 ACUTE KIDNEY FAILURE, UNSPECIFIED: ICD-10-CM

## 2019-10-29 DIAGNOSIS — G92 TOXIC ENCEPHALOPATHY: ICD-10-CM

## 2019-10-29 DIAGNOSIS — N39.0 URINARY TRACT INFECTION, SITE NOT SPECIFIED: ICD-10-CM

## 2019-10-29 DIAGNOSIS — A41.9 SEPSIS, UNSPECIFIED ORGANISM: ICD-10-CM

## 2019-10-29 DIAGNOSIS — B96.20 UNSPECIFIED ESCHERICHIA COLI [E. COLI] AS THE CAUSE OF DISEASES CLASSIFIED ELSEWHERE: ICD-10-CM

## 2019-10-29 DIAGNOSIS — J96.00 ACUTE RESPIRATORY FAILURE, UNSPECIFIED WHETHER WITH HYPOXIA OR HYPERCAPNIA: ICD-10-CM

## 2019-10-29 DIAGNOSIS — E87.6 HYPOKALEMIA: ICD-10-CM

## 2019-10-29 DIAGNOSIS — E87.5 HYPERKALEMIA: ICD-10-CM

## 2019-10-29 DIAGNOSIS — D73.5 INFARCTION OF SPLEEN: ICD-10-CM

## 2019-10-29 DIAGNOSIS — D78.02: ICD-10-CM

## 2019-10-29 DIAGNOSIS — E87.2 ACIDOSIS: ICD-10-CM

## 2019-10-31 PROBLEM — Z00.00 ENCOUNTER FOR PREVENTIVE HEALTH EXAMINATION: Status: ACTIVE | Noted: 2019-10-31

## 2019-11-12 PROCEDURE — 82962 GLUCOSE BLOOD TEST: CPT

## 2019-11-12 PROCEDURE — 80048 BASIC METABOLIC PNL TOTAL CA: CPT

## 2019-11-12 PROCEDURE — 86706 HEP B SURFACE ANTIBODY: CPT

## 2019-11-12 PROCEDURE — 86923 COMPATIBILITY TEST ELECTRIC: CPT

## 2019-11-12 PROCEDURE — C1768: CPT

## 2019-11-12 PROCEDURE — 86900 BLOOD TYPING SEROLOGIC ABO: CPT

## 2019-11-12 PROCEDURE — 83036 HEMOGLOBIN GLYCOSYLATED A1C: CPT

## 2019-11-12 PROCEDURE — 83935 ASSAY OF URINE OSMOLALITY: CPT

## 2019-11-12 PROCEDURE — 93017 CV STRESS TEST TRACING ONLY: CPT

## 2019-11-12 PROCEDURE — 36415 COLL VENOUS BLD VENIPUNCTURE: CPT

## 2019-11-12 PROCEDURE — 71045 X-RAY EXAM CHEST 1 VIEW: CPT

## 2019-11-12 PROCEDURE — 87086 URINE CULTURE/COLONY COUNT: CPT

## 2019-11-12 PROCEDURE — 82533 TOTAL CORTISOL: CPT

## 2019-11-12 PROCEDURE — 84134 ASSAY OF PREALBUMIN: CPT

## 2019-11-12 PROCEDURE — 84484 ASSAY OF TROPONIN QUANT: CPT

## 2019-11-12 PROCEDURE — C1889: CPT

## 2019-11-12 PROCEDURE — 81001 URINALYSIS AUTO W/SCOPE: CPT

## 2019-11-12 PROCEDURE — P9012: CPT

## 2019-11-12 PROCEDURE — 83735 ASSAY OF MAGNESIUM: CPT

## 2019-11-12 PROCEDURE — 82436 ASSAY OF URINE CHLORIDE: CPT

## 2019-11-12 PROCEDURE — 93970 EXTREMITY STUDY: CPT

## 2019-11-12 PROCEDURE — 83690 ASSAY OF LIPASE: CPT

## 2019-11-12 PROCEDURE — C1894: CPT

## 2019-11-12 PROCEDURE — 83605 ASSAY OF LACTIC ACID: CPT

## 2019-11-12 PROCEDURE — 85384 FIBRINOGEN ACTIVITY: CPT

## 2019-11-12 PROCEDURE — C1769: CPT

## 2019-11-12 PROCEDURE — 78452 HT MUSCLE IMAGE SPECT MULT: CPT

## 2019-11-12 PROCEDURE — C1887: CPT

## 2019-11-12 PROCEDURE — 84100 ASSAY OF PHOSPHORUS: CPT

## 2019-11-12 PROCEDURE — 76770 US EXAM ABDO BACK WALL COMP: CPT

## 2019-11-12 PROCEDURE — 93306 TTE W/DOPPLER COMPLETE: CPT

## 2019-11-12 PROCEDURE — 36430 TRANSFUSION BLD/BLD COMPNT: CPT

## 2019-11-12 PROCEDURE — C9248: CPT

## 2019-11-12 PROCEDURE — 85027 COMPLETE CBC AUTOMATED: CPT

## 2019-11-12 PROCEDURE — 80076 HEPATIC FUNCTION PANEL: CPT

## 2019-11-12 PROCEDURE — A9505: CPT

## 2019-11-12 PROCEDURE — 85610 PROTHROMBIN TIME: CPT

## 2019-11-12 PROCEDURE — 86022 PLATELET ANTIBODIES: CPT

## 2019-11-12 PROCEDURE — 84132 ASSAY OF SERUM POTASSIUM: CPT

## 2019-11-12 PROCEDURE — P9017: CPT

## 2019-11-12 PROCEDURE — 87389 HIV-1 AG W/HIV-1&-2 AB AG IA: CPT

## 2019-11-12 PROCEDURE — 82150 ASSAY OF AMYLASE: CPT

## 2019-11-12 PROCEDURE — 85730 THROMBOPLASTIN TIME PARTIAL: CPT

## 2019-11-12 PROCEDURE — 84133 ASSAY OF URINE POTASSIUM: CPT

## 2019-11-12 PROCEDURE — 74019 RADEX ABDOMEN 2 VIEWS: CPT

## 2019-11-12 PROCEDURE — 86803 HEPATITIS C AB TEST: CPT

## 2019-11-12 PROCEDURE — 85018 HEMOGLOBIN: CPT

## 2019-11-12 PROCEDURE — 94002 VENT MGMT INPAT INIT DAY: CPT

## 2019-11-12 PROCEDURE — P9045: CPT

## 2019-11-12 PROCEDURE — 94640 AIRWAY INHALATION TREATMENT: CPT

## 2019-11-12 PROCEDURE — 86704 HEP B CORE ANTIBODY TOTAL: CPT

## 2019-11-12 PROCEDURE — P9035: CPT

## 2019-11-12 PROCEDURE — 82570 ASSAY OF URINE CREATININE: CPT

## 2019-11-12 PROCEDURE — C1874: CPT

## 2019-11-12 PROCEDURE — 85379 FIBRIN DEGRADATION QUANT: CPT

## 2019-11-12 PROCEDURE — 85025 COMPLETE CBC W/AUTO DIFF WBC: CPT

## 2019-11-12 PROCEDURE — A9500: CPT

## 2019-11-12 PROCEDURE — 82140 ASSAY OF AMMONIA: CPT

## 2019-11-12 PROCEDURE — 80053 COMPREHEN METABOLIC PANEL: CPT

## 2019-11-12 PROCEDURE — 82248 BILIRUBIN DIRECT: CPT

## 2019-11-12 PROCEDURE — 84300 ASSAY OF URINE SODIUM: CPT

## 2019-11-12 PROCEDURE — 86850 RBC ANTIBODY SCREEN: CPT

## 2019-11-12 PROCEDURE — 82803 BLOOD GASES ANY COMBINATION: CPT

## 2019-11-12 PROCEDURE — 88307 TISSUE EXAM BY PATHOLOGIST: CPT

## 2019-11-12 PROCEDURE — P9016: CPT

## 2019-11-12 PROCEDURE — 86901 BLOOD TYPING SEROLOGIC RH(D): CPT

## 2019-11-12 PROCEDURE — 87340 HEPATITIS B SURFACE AG IA: CPT

## 2019-11-12 PROCEDURE — 82550 ASSAY OF CK (CPK): CPT

## 2019-11-12 PROCEDURE — 82330 ASSAY OF CALCIUM: CPT

## 2019-11-12 PROCEDURE — 93005 ELECTROCARDIOGRAM TRACING: CPT

## 2019-11-12 PROCEDURE — 87186 SC STD MICRODIL/AGAR DIL: CPT

## 2019-11-12 PROCEDURE — 90935 HEMODIALYSIS ONE EVALUATION: CPT

## 2019-11-12 PROCEDURE — 76000 FLUOROSCOPY <1 HR PHYS/QHP: CPT

## 2019-11-12 PROCEDURE — 88305 TISSUE EXAM BY PATHOLOGIST: CPT

## 2019-11-12 PROCEDURE — 84295 ASSAY OF SERUM SODIUM: CPT

## 2020-09-01 NOTE — DIETITIAN INITIAL EVALUATION ADULT. - ADD RECOMMEND
1. As medically appropriate, recommend Jevity 1.2@20mL/hr, increase by 10-20mL q4h to goal rate of 50mL/hr x24h with Prostat SF 1x/day route per team (1200mL TV, 1540 kcal (1619 kcal with current Propofol rate), 81gm protein, 968mL free water, 120% RDI vitamin/mineral, ~1.5gm protein/kg IBW, 1.4gm protein/kg actual body weight, 23.8 non-protein kcal/kg IBW)- recommend 250mL water flush TID when IVF off. Order for VBF protocol & monitor for signs of intolerance. Maintain aspiration precautions.
No

## 2022-12-08 NOTE — PROCEDURE NOTE - NSPROCNAME_GEN_A_CORE
[facility] as we still have not received [facility] for [facility]. Spoke with Nils at [facility] who stated she was faxing over the auth to our office fax [phone].   Central Line Insertion

## 2023-05-05 NOTE — DISCHARGE NOTE FOR THE EXPIRED PATIENT - REASON AUTOPSY NOT GRANTED
Family refused autopsy Drysol Counseling:  I discussed with the patient the risks of drysol/aluminum chloride including but not limited to skin rash, itching, irritation, burning.